# Patient Record
Sex: FEMALE | Race: ASIAN | Employment: FULL TIME | URBAN - METROPOLITAN AREA
[De-identification: names, ages, dates, MRNs, and addresses within clinical notes are randomized per-mention and may not be internally consistent; named-entity substitution may affect disease eponyms.]

---

## 2017-06-22 PROCEDURE — 82607 VITAMIN B-12: CPT | Performed by: FAMILY MEDICINE

## 2017-06-22 PROCEDURE — 82746 ASSAY OF FOLIC ACID SERUM: CPT | Performed by: FAMILY MEDICINE

## 2017-09-15 PROCEDURE — 84479 ASSAY OF THYROID (T3 OR T4): CPT | Performed by: FAMILY MEDICINE

## 2017-11-26 ENCOUNTER — HOSPITAL ENCOUNTER (EMERGENCY)
Facility: HOSPITAL | Age: 42
Discharge: HOME | End: 2017-11-26
Attending: EMERGENCY MEDICINE
Payer: COMMERCIAL

## 2017-11-26 VITALS
TEMPERATURE: 98 F | HEIGHT: 66 IN | RESPIRATION RATE: 16 BRPM | WEIGHT: 132.25 LBS | HEART RATE: 72 BPM | BODY MASS INDEX: 21.25 KG/M2 | SYSTOLIC BLOOD PRESSURE: 100 MMHG | DIASTOLIC BLOOD PRESSURE: 64 MMHG | OXYGEN SATURATION: 100 %

## 2017-11-26 DIAGNOSIS — M54.50 BACK PAIN, LUMBOSACRAL: Primary | ICD-10-CM

## 2017-11-26 DIAGNOSIS — F11.10 OPIATE ABUSE, CONTINUOUS (HCC): ICD-10-CM

## 2017-11-26 PROCEDURE — 99283 EMERGENCY DEPT VISIT LOW MDM: CPT

## 2017-11-26 RX ORDER — CYCLOBENZAPRINE HCL 10 MG
10 TABLET ORAL 3 TIMES DAILY PRN
Qty: 20 TABLET | Refills: 0 | Status: SHIPPED | OUTPATIENT
Start: 2017-11-26 | End: 2017-12-03

## 2017-11-26 NOTE — ED PROVIDER NOTES
Patient Seen in: [facility] Emergency Department    History   Patient presents with:  Back Pain (musculoskeletal)    Stated Complaint: BACK PAIN    HPI    53-year-old female presents to the emergency department for evaluation of increased low back p auscultation. Heart exam: Normal S1-S2 without extra sounds or murmurs. Regular rate and rhythm. Back: There is tenderness over the left SI joint. No posterior midline tenderness, crepitations or step-offs. Extremities are atraumatic.   Skin is dry wit 0

## 2017-11-26 NOTE — ED INITIAL ASSESSMENT (HPI)
Pt arrives with L lower back pain radiating to L glute. Pain is chronic and has gotten worse over the last few weeks. Today pain has increased and un relieved with motrin. +nausea and vomiting. Denies any  complaints. Denies fever or chills.  Pt reports l

## 2017-11-26 NOTE — ED NOTES
Pt states she has ran out of her norco at home, pt states she has been giving her norco to her mother in law.

## 2017-11-26 NOTE — ED NOTES
Patient being discharged home  Patient demonstrates improvement upon d/c. Pt verbalized understanding of d/c instructions. AVS provided.    Pt home with family

## 2020-02-14 ENCOUNTER — HOSPITAL ENCOUNTER (EMERGENCY)
Facility: HOSPITAL | Age: 45
Discharge: HOME | End: 2020-02-14
Payer: COMMERCIAL

## 2020-02-14 ENCOUNTER — APPOINTMENT (OUTPATIENT)
Dept: GENERAL RADIOLOGY | Facility: HOSPITAL | Age: 45
End: 2020-02-14
Payer: COMMERCIAL

## 2020-02-14 VITALS
HEART RATE: 63 BPM | OXYGEN SATURATION: 98 % | RESPIRATION RATE: 18 BRPM | SYSTOLIC BLOOD PRESSURE: 116 MMHG | DIASTOLIC BLOOD PRESSURE: 86 MMHG | BODY MASS INDEX: 22 KG/M2 | WEIGHT: 130 LBS | TEMPERATURE: 98 F
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
## 2020-02-14 DIAGNOSIS — S01.81XA FACIAL LACERATION, INITIAL ENCOUNTER: ICD-10-CM

## 2020-02-14 DIAGNOSIS — S09.90XA MINOR HEAD INJURY, INITIAL ENCOUNTER: ICD-10-CM

## 2020-02-14 PROCEDURE — 12011 RPR F/E/E/N/L/M 2.5 CM/<: CPT

## 2020-02-14 PROCEDURE — 99283 EMERGENCY DEPT VISIT LOW MDM: CPT

## 2020-02-14 PROCEDURE — 12011 RPR F/E/E/N/L/M 2.5 CM/<: CPT | Performed by: NURSE PRACTITIONER

## 2020-02-14 PROCEDURE — 73130 X-RAY EXAM OF HAND: CPT

## 2020-02-15 NOTE — ED PROVIDER NOTES
I reviewed that chart and discussed the case. I have examined the patient and noted very pleasant 73-year-old female that slipped on the ice and fell and struck her head on the wall. There was no LOC.   Happened earlier today around 4 PM.  She is a minor

## 2020-02-15 NOTE — ED INITIAL ASSESSMENT (HPI)
44YF c/c of fall Pt state she slipped on the ice and fall hitting her and injuring her L wrist/hand denies any LOC

## 2020-02-15 NOTE — ED PROVIDER NOTES
Patient Seen in: [facility] Emergency Department      History   Patient presents with:  Head Neck Injury  Laceration Abrasion    Stated Complaint: lacs to face, slipped on ice, no LOC or nv    24-year-old female presents today with history of slip a Comments: No hematoma noted to the scalp. Mouth/Throat:      Mouth: Mucous membranes are moist.   Eyes:      Extraocular Movements: Extraocular movements intact.       Conjunctiva/sclera: Conjunctivae normal.      Pupils: Pupils are equal, round, and Patient tolerated procedure well  Wound was dressed with Neosporin ointment and gauze dressing applied  Tetanus status: UTD. Antiobitic prophylaxis: None  Suture removal in 5 days  Wound care instructions given.  Keep affected area keep and clean  [facility]

## 2020-11-12 PROBLEM — M25.831 ULNAR ABUTMENT SYNDROME OF RIGHT WRIST: Status: ACTIVE | Noted: 2020-11-12

## 2021-03-05 PROBLEM — M47.816 LUMBAR SPONDYLOSIS: Status: ACTIVE | Noted: 2021-03-05

## 2021-03-05 PROBLEM — M48.061 LUMBAR FORAMINAL STENOSIS: Status: ACTIVE | Noted: 2021-03-05

## 2024-01-03 ENCOUNTER — HOSPITAL ENCOUNTER (OUTPATIENT)
Age: 49
Discharge: HOME | End: 2024-01-03
Payer: COMMERCIAL

## 2024-01-03 VITALS
HEART RATE: 61 BPM | TEMPERATURE: 98 F | DIASTOLIC BLOOD PRESSURE: 80 MMHG | OXYGEN SATURATION: 100 % | SYSTOLIC BLOOD PRESSURE: 139 MMHG | RESPIRATION RATE: 18 BRPM

## 2024-01-03 DIAGNOSIS — M54.50 LUMBAR PAIN: Primary | ICD-10-CM

## 2024-01-03 RX ORDER — HYDROCODONE BITARTRATE AND ACETAMINOPHEN 5; 325 MG/1; MG/1
1 TABLET ORAL ONCE
Status: COMPLETED | OUTPATIENT
Start: 2024-01-03 | End: 2024-01-03

## 2024-01-03 RX ORDER — CYCLOBENZAPRINE HCL 10 MG
10 TABLET ORAL 3 TIMES DAILY PRN
Qty: 20 TABLET | Refills: 0 | Status: SHIPPED | OUTPATIENT
Start: 2024-01-03 | End: 2024-01-10

## 2024-01-03 RX ORDER — IBUPROFEN 600 MG/1
600 TABLET ORAL ONCE
Status: COMPLETED | OUTPATIENT
Start: 2024-01-03 | End: 2024-01-03

## 2024-01-03 RX ORDER — HYDROCODONE BITARTRATE AND ACETAMINOPHEN 5; 325 MG/1; MG/1
1 TABLET ORAL EVERY 6 HOURS PRN
Qty: 15 TABLET | Refills: 0 | Status: SHIPPED | OUTPATIENT
Start: 2024-01-03 | End: 2024-01-08

## 2024-01-04 NOTE — ED INITIAL ASSESSMENT (HPI)
Pt c/o sudden onset of low back pain. Pt denies injury. Pt has been taking motrin. Pt states the pain has been for 3 days.

## 2024-01-04 NOTE — DISCHARGE INSTRUCTIONS
Take the Flexeril and Norco as directed.  If you take the Norco use a stool softener such as Colace and do not drive for 6 to 8 hours.  Apply lidocaine and Salonpas patches to area.  If any numbness or weakness to your extremities, incontinence, or worsening pain go to the emergency room.  Follow-up with your spine doctor.

## 2024-01-04 NOTE — ED PROVIDER NOTES
Patient Seen in: [facility]      History     Chief Complaint   Patient presents with    Back Pain     Stated Complaint: Back pain    Subjective:   HPI  48-year-old female with lumbar foraminal stenosis, lumbar spondylosis, and back pain presents complaining of lower back pain onset 3 days ago without any known injury.  She denies any numbness or weakness to her extremities.  She denies any loss of bowel or bladder infection.  She called her spine pain management doctor to request an epidural injection but did not hear back from them.  Her last injection was in October 2022.    Objective:   Past Medical History:   Diagnosis Date    Hospitalism     none              No pertinent past surgical history.              No pertinent social history.            Review of Systems   All other systems reviewed and are negative.      Positive for stated complaint: Back pain  Other systems are as noted in HPI.  Constitutional and vital signs reviewed.      All other systems reviewed and negative except as noted above.    Physical Exam     ED Triage Vitals [01/03/24 1937]   /80   Pulse 61   Resp 18   Temp 98.1 °F (36.7 °C)   Temp src Temporal   SpO2 100 %   O2 Device None (Room air)       Current:/80   Pulse 61   Temp 98.1 °F (36.7 °C) (Temporal)   Resp 18   LMP 12/20/2023   SpO2 100%         Physical Exam  Vitals and nursing note reviewed.   Constitutional:       Appearance: She is well-developed.   Cardiovascular:      Rate and Rhythm: Normal rate and regular rhythm.      Heart sounds: Normal heart sounds.   Pulmonary:      Effort: Pulmonary effort is normal.      Breath sounds: Normal breath sounds.   Musculoskeletal:      Comments: Lumbar spinal tenderness.  Strength and sensation intact to lower extremities.  Ambulatory with no foot drop.  Pulses intact.   Skin:     General: Skin is warm and dry.   Neurological:      Mental Status: She is alert and oriented to person, place, and time.                ED Course   Labs Reviewed - No data to display                   [facility]     Medical Decision Making  48-year-old female with lumbar foraminal stenosis, lumbar spondylosis, and back pain presents complaining of lower back pain onset 3 days ago without any known injury.  She denies any numbness or weakness to her extremities.  She denies any loss of bowel or bladder infection.  She called her spine pain management doctor to request an epidural injection but did not hear back from them.  Her last injection was in October 2022.    Clinical impression: Lumbar pain    Differential diagnosis: Lumbar pain, cauda equina, epidural abscess    Patient is in pain.  She has no known injury.  She has no foot drop and no neurodeficit on exam.  She has been taking Motrin for pain.  She will be given a Norco here and discharged with Norco and Flexeril with follow-up with her spine specialist.  ER precautions provided.    Problems Addressed:  Lumbar pain: acute illness or injury        Disposition and Plan     Clinical Impression:  1. Lumbar pain         Disposition:  Discharge  1/3/2024  7:51 pm    Follow-up:  Tyson Campoverde MD  [address] [place] [address]  [address] [place] [address]  [phone]    Call             Medications Prescribed:  Current Discharge Medication List        START taking these medications    Details   cyclobenzaprine 10 MG Oral Tab Take 1 tablet (10 mg total) by mouth 3 (three) times daily as needed for Muscle spasms.  Qty: 20 tablet, Refills: 0      HYDROcodone-acetaminophen 5-325 MG Oral Tab Take 1 tablet by mouth every 6 (six) hours as needed for Pain.  Qty: 15 tablet, Refills: 0    Associated Diagnoses: Lumbar pain

## 2025-03-12 ENCOUNTER — HOSPITAL ENCOUNTER (INPATIENT)
Facility: HOSPITAL | Age: 50
LOS: 6 days | Discharge: NURSING, REHAB OR LONG-TERM CARE | End: 2025-03-19
Attending: EMERGENCY MEDICINE | Admitting: INTERNAL MEDICINE
Payer: COMMERCIAL

## 2025-03-12 ENCOUNTER — APPOINTMENT (OUTPATIENT)
Dept: CT IMAGING | Facility: HOSPITAL | Age: 50
End: 2025-03-12
Attending: EMERGENCY MEDICINE
Payer: COMMERCIAL

## 2025-03-12 ENCOUNTER — APPOINTMENT (OUTPATIENT)
Dept: MRI IMAGING | Facility: HOSPITAL | Age: 50
End: 2025-03-12
Attending: EMERGENCY MEDICINE
Payer: COMMERCIAL

## 2025-03-12 DIAGNOSIS — Z98.890 S/P CERVICAL DISC REPLACEMENT: ICD-10-CM

## 2025-03-12 DIAGNOSIS — Z98.890 HX OF CERVICAL SPINE SURGERY: Primary | ICD-10-CM

## 2025-03-12 DIAGNOSIS — R53.1 WEAKNESS: ICD-10-CM

## 2025-03-12 PROBLEM — R27.0 ATAXIA: Status: ACTIVE | Noted: 2025-03-12

## 2025-03-12 LAB
ALBUMIN SERPL-MCNC: 4.4 G/DL (ref 3.2–4.8)
ALBUMIN/GLOB SERPL: 1.5 {RATIO} (ref 1–2)
ALP LIVER SERPL-CCNC: 68 U/L
ALT SERPL-CCNC: 15 U/L
ANION GAP SERPL CALC-SCNC: 11 MMOL/L (ref 0–18)
AST SERPL-CCNC: 28 U/L (ref ?–34)
BASOPHILS # BLD AUTO: 0.04 X10(3) UL (ref 0–0.2)
BASOPHILS NFR BLD AUTO: 0.4 %
BILIRUB SERPL-MCNC: 0.2 MG/DL (ref 0.3–1.2)
BUN BLD-MCNC: 8 MG/DL (ref 9–23)
CALCIUM BLD-MCNC: 8.8 MG/DL (ref 8.7–10.6)
CHLORIDE SERPL-SCNC: 112 MMOL/L (ref 98–112)
CO2 SERPL-SCNC: 20 MMOL/L (ref 21–32)
CREAT BLD-MCNC: 0.69 MG/DL
EGFRCR SERPLBLD CKD-EPI 2021: 104 ML/MIN/1.73M2 (ref 60–?)
EOSINOPHIL # BLD AUTO: 0.02 X10(3) UL (ref 0–0.7)
EOSINOPHIL NFR BLD AUTO: 0.2 %
ERYTHROCYTE [DISTWIDTH] IN BLOOD BY AUTOMATED COUNT: 11.9 %
GLOBULIN PLAS-MCNC: 2.9 G/DL (ref 2–3.5)
GLUCOSE BLD-MCNC: 143 MG/DL (ref 70–99)
HCT VFR BLD AUTO: 36.3 %
HGB BLD-MCNC: 12.1 G/DL
IMM GRANULOCYTES # BLD AUTO: 0.15 X10(3) UL (ref 0–1)
IMM GRANULOCYTES NFR BLD: 1.5 %
LYMPHOCYTES # BLD AUTO: 0.91 X10(3) UL (ref 1–4)
LYMPHOCYTES NFR BLD AUTO: 9.4 %
MCH RBC QN AUTO: 29.5 PG (ref 26–34)
MCHC RBC AUTO-ENTMCNC: 33.3 G/DL (ref 31–37)
MCV RBC AUTO: 88.5 FL
MONOCYTES # BLD AUTO: 0.11 X10(3) UL (ref 0.1–1)
MONOCYTES NFR BLD AUTO: 1.1 %
NEUTROPHILS # BLD AUTO: 8.5 X10 (3) UL (ref 1.5–7.7)
NEUTROPHILS # BLD AUTO: 8.5 X10(3) UL (ref 1.5–7.7)
NEUTROPHILS NFR BLD AUTO: 87.4 %
OSMOLALITY SERPL CALC.SUM OF ELEC: 297 MOSM/KG (ref 275–295)
PLATELET # BLD AUTO: 312 10(3)UL (ref 150–450)
POTASSIUM SERPL-SCNC: 3.6 MMOL/L (ref 3.5–5.1)
PROT SERPL-MCNC: 7.3 G/DL (ref 5.7–8.2)
RBC # BLD AUTO: 4.1 X10(6)UL
SODIUM SERPL-SCNC: 143 MMOL/L (ref 136–145)
WBC # BLD AUTO: 9.7 X10(3) UL (ref 4–11)

## 2025-03-12 PROCEDURE — 72141 MRI NECK SPINE W/O DYE: CPT | Performed by: EMERGENCY MEDICINE

## 2025-03-12 PROCEDURE — 96375 TX/PRO/DX INJ NEW DRUG ADDON: CPT

## 2025-03-12 PROCEDURE — 96374 THER/PROPH/DIAG INJ IV PUSH: CPT

## 2025-03-12 PROCEDURE — 80053 COMPREHEN METABOLIC PANEL: CPT | Performed by: EMERGENCY MEDICINE

## 2025-03-12 PROCEDURE — 99285 EMERGENCY DEPT VISIT HI MDM: CPT

## 2025-03-12 PROCEDURE — 96376 TX/PRO/DX INJ SAME DRUG ADON: CPT

## 2025-03-12 PROCEDURE — 72125 CT NECK SPINE W/O DYE: CPT | Performed by: EMERGENCY MEDICINE

## 2025-03-12 PROCEDURE — 85025 COMPLETE CBC W/AUTO DIFF WBC: CPT | Performed by: EMERGENCY MEDICINE

## 2025-03-12 PROCEDURE — 96361 HYDRATE IV INFUSION ADD-ON: CPT

## 2025-03-12 RX ORDER — ACETAMINOPHEN 500 MG
1000 TABLET ORAL EVERY 8 HOURS PRN
COMMUNITY

## 2025-03-12 RX ORDER — ONDANSETRON 2 MG/ML
4 INJECTION INTRAMUSCULAR; INTRAVENOUS EVERY 4 HOURS PRN
Status: ACTIVE | OUTPATIENT
Start: 2025-03-12 | End: 2025-03-12

## 2025-03-12 RX ORDER — HYDROCODONE BITARTRATE AND ACETAMINOPHEN 5; 325 MG/1; MG/1
2 TABLET ORAL EVERY 4 HOURS PRN
Status: DISCONTINUED | OUTPATIENT
Start: 2025-03-12 | End: 2025-03-13

## 2025-03-12 RX ORDER — LORAZEPAM 2 MG/ML
1 INJECTION INTRAMUSCULAR ONCE
Status: COMPLETED | OUTPATIENT
Start: 2025-03-12 | End: 2025-03-12

## 2025-03-12 RX ORDER — HYDROMORPHONE HYDROCHLORIDE 1 MG/ML
0.8 INJECTION, SOLUTION INTRAMUSCULAR; INTRAVENOUS; SUBCUTANEOUS EVERY 2 HOUR PRN
Status: DISCONTINUED | OUTPATIENT
Start: 2025-03-12 | End: 2025-03-17

## 2025-03-12 RX ORDER — ONDANSETRON 2 MG/ML
4 INJECTION INTRAMUSCULAR; INTRAVENOUS ONCE
Status: COMPLETED | OUTPATIENT
Start: 2025-03-12 | End: 2025-03-12

## 2025-03-12 RX ORDER — ACETAMINOPHEN 325 MG/1
650 TABLET ORAL EVERY 4 HOURS PRN
Status: DISCONTINUED | OUTPATIENT
Start: 2025-03-12 | End: 2025-03-19

## 2025-03-12 RX ORDER — HYDROMORPHONE HYDROCHLORIDE 1 MG/ML
0.2 INJECTION, SOLUTION INTRAMUSCULAR; INTRAVENOUS; SUBCUTANEOUS EVERY 2 HOUR PRN
Status: DISCONTINUED | OUTPATIENT
Start: 2025-03-12 | End: 2025-03-17

## 2025-03-12 RX ORDER — HYDROCODONE BITARTRATE AND ACETAMINOPHEN 5; 325 MG/1; MG/1
1 TABLET ORAL EVERY 4 HOURS PRN
Status: DISCONTINUED | OUTPATIENT
Start: 2025-03-12 | End: 2025-03-13

## 2025-03-12 RX ORDER — ENOXAPARIN SODIUM 100 MG/ML
40 INJECTION SUBCUTANEOUS DAILY
Status: DISCONTINUED | OUTPATIENT
Start: 2025-03-13 | End: 2025-03-13

## 2025-03-12 RX ORDER — CYCLOBENZAPRINE HCL 10 MG
1 TABLET ORAL 3 TIMES DAILY PRN
COMMUNITY
Start: 2025-03-10

## 2025-03-12 RX ORDER — HYDROMORPHONE HYDROCHLORIDE 1 MG/ML
1 INJECTION, SOLUTION INTRAMUSCULAR; INTRAVENOUS; SUBCUTANEOUS ONCE
Status: COMPLETED | OUTPATIENT
Start: 2025-03-12 | End: 2025-03-12

## 2025-03-12 RX ORDER — HYDROCODONE BITARTRATE AND ACETAMINOPHEN 10; 325 MG/1; MG/1
1 TABLET ORAL 3 TIMES DAILY PRN
COMMUNITY
Start: 2025-02-24

## 2025-03-12 RX ORDER — HYDROMORPHONE HYDROCHLORIDE 1 MG/ML
0.5 INJECTION, SOLUTION INTRAMUSCULAR; INTRAVENOUS; SUBCUTANEOUS EVERY 30 MIN PRN
Status: DISCONTINUED | OUTPATIENT
Start: 2025-03-12 | End: 2025-03-12

## 2025-03-12 RX ORDER — HYDROMORPHONE HYDROCHLORIDE 1 MG/ML
0.4 INJECTION, SOLUTION INTRAMUSCULAR; INTRAVENOUS; SUBCUTANEOUS EVERY 2 HOUR PRN
Status: DISCONTINUED | OUTPATIENT
Start: 2025-03-12 | End: 2025-03-17

## 2025-03-12 NOTE — ED QUICK NOTES
Pt return from MRI, placed back on monitor. Pt given call light, lights dimmed, pt has no additional needs at this time.

## 2025-03-12 NOTE — ED INITIAL ASSESSMENT (HPI)
Patient to ED via EMS, reports she was having a c4-c5, c5-c6 cervical disc replacement today.  During procedure it was noticed the patient had some decreased sensation, numbness and tingling to bilateral hands and feet.  Sent to ED for imaging

## 2025-03-12 NOTE — ED QUICK NOTES
Orders for admission, patient is aware of plan and ready to go upstairs. Any questions, please call ED RN Stephanie at extension [phone].     Patient Covid vaccination status: Fully vaccinated     COVID Test Ordered in ED: None    COVID Suspicion at Admission: N/A    Running Infusions:      Mental Status/LOC at time of transport: A&Ox4    Other pertinent information:   CIWA score: N/A   NIH score:  N/A

## 2025-03-12 NOTE — H&P
[facility] Hospitalist History and Physical      PCP: Hu Orozco DO    History of Present Illness: This is the story of Ms. Brigitte Ac who is a 49 y/o F w/ no major PMHx who presents after outpatient cervical spine surgery where she underwent a C4-C5, C5-C6 Anterior Cervical Discectomy and cervical disk replacement. Post operatively it appears the patient had notable weakness in the upper and lower extremities and was having difficulties bearing weight and walking/transferring on her own. ER evaluation was recommended. Patient admits to some parathesias in her fingers/toes that are still persisting but otherwise no other major complaints. Denies dizziness/lightheadedness, dysuria, n/v/d.  ED Vitals: wnl  Labs:  Stable    CT Cervical Spine: showed post surgical changes of microdiscectomy w/ disc prostheses at C4/C5 and C5/C6 levels, no subluxaion, no acute fracture  MRI Cervical Spine: post operative changes and osteophytes but otherwise no major issues.    Spine surgery was consulted, patient was given multiple doses of pain medications, Ativan, Zofran, and 1L NS bolus and admitted for observation overnight.    Past Medical History:    Hospitalism    none      History reviewed. No pertinent surgical history.     No current outpatient medications on file.       Social History     Tobacco Use    Smoking status: Never    Smokeless tobacco: Never   Substance Use Topics    Alcohol use: Yes     Alcohol/week: 0.0 standard drinks of alcohol     Comment: socially        OBJECTIVE:  /90   Pulse 99   Temp 97.8 °F (36.6 °C) (Oral)   Resp (!) 28   Ht 5' 4\" (1.626 m)   Wt 118 lb (53.5 kg)   LMP 01/22/2025 (Approximate)   SpO2 100%   BMI 20.25 kg/m²   Gen: No acute distress, alert and oriented x3  Pulm: Lungs clear bilaterally, normal respiratory effort  CV: Heart with regular rate and rhythm  Abd: Abdomen soft, nontender, non-distended  Skin: no rashes or lesions  Extremities: no edema noted  Neuro:   no focal deficits, full strength in uppers and lowers, no sensation changes, no speech deficits      Data Review:    Pertinent Labs and Imaging independently reviewed    Assessment/Plan:   Outpatient records or previous hospital records reviewed.   Chad Freemanist to continue to follow patient while in house    A/P: 51 y/o F w/ no major PMHx who presents after outpatient cervical spine surgery where she underwent a C4-C5, C5-C6 Anterior Cervical Discectomy and cervical disk replacement. Presenting for upper/lower extremity weakness/ataxia    Ataxia/Parasthesias  Hx of Cervical Spine surgery today  POD0  CT/MRIs noted, no major abnormalities  Plan:  - Spine surgery on consult  - PRN pain control  - PT/OT evaluations      A total of 38 minutes taken with patient and coordinating care.  Greater than 50% face to face encounter.      Ace Germain D.O.  [facility]

## 2025-03-12 NOTE — ED QUICK NOTES
Care endorsed from Beryl RN    Pt  at bedside, pt awaiting MRI. Pt sitting upright in bed with a post op bandage on her anterior neck.

## 2025-03-13 LAB
ANION GAP SERPL CALC-SCNC: 8 MMOL/L (ref 0–18)
BASOPHILS # BLD AUTO: 0.03 X10(3) UL (ref 0–0.2)
BASOPHILS NFR BLD AUTO: 0.2 %
BUN BLD-MCNC: 8 MG/DL (ref 9–23)
CALCIUM BLD-MCNC: 9.2 MG/DL (ref 8.7–10.6)
CHLORIDE SERPL-SCNC: 108 MMOL/L (ref 98–112)
CO2 SERPL-SCNC: 26 MMOL/L (ref 21–32)
CREAT BLD-MCNC: 0.67 MG/DL
EGFRCR SERPLBLD CKD-EPI 2021: 105 ML/MIN/1.73M2 (ref 60–?)
EOSINOPHIL # BLD AUTO: 0 X10(3) UL (ref 0–0.7)
EOSINOPHIL NFR BLD AUTO: 0 %
ERYTHROCYTE [DISTWIDTH] IN BLOOD BY AUTOMATED COUNT: 11.9 %
GLUCOSE BLD-MCNC: 135 MG/DL (ref 70–99)
HCT VFR BLD AUTO: 34.3 %
HGB BLD-MCNC: 11.8 G/DL
IMM GRANULOCYTES # BLD AUTO: 0.07 X10(3) UL (ref 0–1)
IMM GRANULOCYTES NFR BLD: 0.5 %
LYMPHOCYTES # BLD AUTO: 1.54 X10(3) UL (ref 1–4)
LYMPHOCYTES NFR BLD AUTO: 10.7 %
MAGNESIUM SERPL-MCNC: 1.8 MG/DL (ref 1.6–2.6)
MCH RBC QN AUTO: 30.2 PG (ref 26–34)
MCHC RBC AUTO-ENTMCNC: 34.4 G/DL (ref 31–37)
MCV RBC AUTO: 87.7 FL
MONOCYTES # BLD AUTO: 0.96 X10(3) UL (ref 0.1–1)
MONOCYTES NFR BLD AUTO: 6.7 %
NEUTROPHILS # BLD AUTO: 11.77 X10 (3) UL (ref 1.5–7.7)
NEUTROPHILS # BLD AUTO: 11.77 X10(3) UL (ref 1.5–7.7)
NEUTROPHILS NFR BLD AUTO: 81.9 %
OSMOLALITY SERPL CALC.SUM OF ELEC: 294 MOSM/KG (ref 275–295)
PLATELET # BLD AUTO: 341 10(3)UL (ref 150–450)
POTASSIUM SERPL-SCNC: 4.8 MMOL/L (ref 3.5–5.1)
RBC # BLD AUTO: 3.91 X10(6)UL
SODIUM SERPL-SCNC: 142 MMOL/L (ref 136–145)
WBC # BLD AUTO: 14.4 X10(3) UL (ref 4–11)

## 2025-03-13 PROCEDURE — 80048 BASIC METABOLIC PNL TOTAL CA: CPT | Performed by: INTERNAL MEDICINE

## 2025-03-13 PROCEDURE — 97535 SELF CARE MNGMENT TRAINING: CPT

## 2025-03-13 PROCEDURE — 83735 ASSAY OF MAGNESIUM: CPT | Performed by: INTERNAL MEDICINE

## 2025-03-13 PROCEDURE — 97116 GAIT TRAINING THERAPY: CPT

## 2025-03-13 PROCEDURE — 85025 COMPLETE CBC W/AUTO DIFF WBC: CPT | Performed by: INTERNAL MEDICINE

## 2025-03-13 PROCEDURE — 97162 PT EVAL MOD COMPLEX 30 MIN: CPT

## 2025-03-13 PROCEDURE — 97166 OT EVAL MOD COMPLEX 45 MIN: CPT

## 2025-03-13 RX ORDER — FAMOTIDINE 20 MG/1
20 TABLET, FILM COATED ORAL DAILY
Status: DISCONTINUED | OUTPATIENT
Start: 2025-03-13 | End: 2025-03-19

## 2025-03-13 RX ORDER — DEXAMETHASONE SODIUM PHOSPHATE 4 MG/ML
10 VIAL (ML) INJECTION EVERY 8 HOURS
Status: COMPLETED | OUTPATIENT
Start: 2025-03-13 | End: 2025-03-14

## 2025-03-13 RX ORDER — DIAZEPAM 5 MG/1
5 TABLET ORAL EVERY 8 HOURS PRN
Status: DISCONTINUED | OUTPATIENT
Start: 2025-03-13 | End: 2025-03-14

## 2025-03-13 RX ORDER — OXYCODONE AND ACETAMINOPHEN 5; 325 MG/1; MG/1
1 TABLET ORAL EVERY 4 HOURS PRN
Status: DISCONTINUED | OUTPATIENT
Start: 2025-03-13 | End: 2025-03-19

## 2025-03-13 RX ORDER — MAGNESIUM OXIDE 400 MG/1
400 TABLET ORAL ONCE
Status: COMPLETED | OUTPATIENT
Start: 2025-03-13 | End: 2025-03-13

## 2025-03-13 RX ORDER — OXYCODONE AND ACETAMINOPHEN 5; 325 MG/1; MG/1
2 TABLET ORAL EVERY 4 HOURS PRN
Status: DISCONTINUED | OUTPATIENT
Start: 2025-03-13 | End: 2025-03-19

## 2025-03-13 NOTE — PROGRESS NOTES
[facility]   part of [facility] Hospitalist Progress Note     Brigitte Ac Patient Status:  Observation    1975 MRN [id number]   Location [facility] 3SW-A Attending Ace Germain,    Hosp Day # 0 PCP Hu Orozco DO     Assessment & Plan:    A/P: 49 y/o F w/ no major PMHx who presents after outpatient cervical spine surgery where she underwent a C4-C5, C5-C6 Anterior Cervical Discectomy and cervical disk replacement. Presenting for upper/lower extremity weakness/ataxia     Ataxia/Parasthesias  Hx of Cervical Spine surgery today  POD1  CT/MRIs noted, no major abnormalities  Plan:  - Spine surgery on consult, recommending IV steroids, Decadron 10mg q8h scheduled  - Pepcid started while on steroids  - PRN pain control  - PT/OT evaluations    DVT Ppx: no DVT until POD5 as per Spine    Subjective:     Patient seen and examined this morning at bedside. Disappointed and concerned that she is so weak and having coordination issues. Otherwise no other acute complaints. Working w/ PT    Vital signs:  Temp:  [97.4 °F (36.3 °C)-98.2 °F (36.8 °C)] 98.2 °F (36.8 °C)  Pulse:  [64-99] 64  Resp:  [9-29] 18  BP: (109-135)/(70-97) 122/79  SpO2:  [95 %-100 %] 98 %    Physical Exam:    General: No acute distress.   Respiratory: Clear to auscultation bilaterally. No wheezes.  Cardiovascular: S1, S2. Regular rate and rhythm  Abdomen: Soft, nontender, nondistended.  Positive bowel sounds. No rebound or guarding.  Extremities: No edema.  Neuro:  Grossly non focal, no motor deficits. Coordination slowed, 3-4/5 strength in uppers and lowers      Diagnostic Data:    Pertinent Labs and Imaging independently reviewed  Comments:  WBC elevated today  HgB drift noted    Ace Germain D.O.  [facility] Hospitalist     Supplementary Documentation:   DVT Mechanical Prophylaxis:   SCDs,    DVT Pharmacologic Prophylaxis   Medication   None                Code Status: Not on file  Chin: No  urinary catheter in place  Chin Duration (in days):   Central line:    CHRISTINA:

## 2025-03-13 NOTE — PLAN OF CARE
Patient A&OX4, VSS on RA. Patient reports severe pain to posterior neck; IV pain medications given. Spine PA made aware of current pain regimen; see orders. IV steroids started. PT recc Acute Rehab. Patient interested in DC home. Plan to DC pending medical clearance and mobility. Plan of care reviewed with patient. Patient demonstrates understanding.

## 2025-03-13 NOTE — OCCUPATIONAL THERAPY NOTE
OCCUPATIONAL THERAPY NEURO EVALUATION - INPATIENT     Room Number: 376/376-A  Evaluation Date: 3/13/2025  Type of Evaluation: Initial  Presenting Problem: upper/lower extremity weakness/ataxia    Physician Order: IP Consult to Occupational Therapy  Reason for Therapy: ADL/IADL Dysfunction and Discharge Planning    OCCUPATIONAL THERAPY ASSESSMENT   Patient is currently functioning below baseline with toileting, bathing, upper body dressing, lower body dressing, grooming, bed mobility, transfers, static standing balance, dynamic standing balance, and functional standing tolerance. Prior to admission, patient's baseline is independent, active.  Patient is requiring SBA to MOD A as a result of the following impairments: decreased functional strength, decreased functional reach, pain, impaired standing balance, impaired coordination, impaired motor planning, decreased muscular endurance, and decreased sensation. Occupational Therapy will continue to follow for duration of hospitalization.    Patient will benefit from continued skilled OT Services to facilitate return to prior level of function as patient demonstrates high motivation with excellent tolerance to an intensive therapy program    History Related to Current Admission: Patient is a 50 year old female admitted on 3/12/2025 from home for new onset of upper/lower extremity weakness and ataxia. Pt had OP C4-6 CDR on 3/12.     Co-Morbidities : s/p C4-C6 CDR as an OP 3/12    Imaging:  MRI cervical spine 3/12  1. Expected postoperative changes are noted at C4-C5 and C5-C6 with fluid signal within the disc space surrounding the disc space device and also in the perispinal soft tissues anteriorly.   2. Persistent osteophytes cause narrowing of the central canal at the fused levels.  Within the limits of this study there is no significant cord signal abnormality.     Recommendations for nursing staff:   Transfers: MIN to MOD A with RW  Toileting location: toilet      EVALUATION SESSION:  Patient Start of Session: semi-supine     FUNCTIONAL TRANSFER ASSESSMENT  Sit to Stand: Edge of Bed  Edge of Bed: Moderate Assist  Toilet Transfer: Minimal Assist    BED MOBILITY  Supine to Sit : Moderate Assist  Scooting: CGA    BALANCE ASSESSMENT  Static Sitting: Stand-by Assist  Static Standing: Moderate Assist    FUNCTIONAL ADL ASSESSMENT  Grooming Standing: Moderate Assist  LB Dressing Seated: Moderate Assist  LB Dressing Standing: Contact Guard Assist  Toileting Seated: Stand-by Assist  Toileting Standing: Minimal Assist    ACTIVITY TOLERANCE: Pt on room air and denies SOB, dizziness or lightheadedness throughout session. No significant change in vitals noted.     COGNITION  Overall Cognitive Status:  WFL - within functional limits    VISION  No visual changes     PERCEPTION  Overall Perception Status:   WFL - within functional limits    Communication: Pt able to communicate basic wants and needs without difficulty.    Behavioral/Emotional/Social: Pt is very pleasant and agreeable to session. Pt is understandable overwhelmed with these unexpected symptoms after surgery but appears to be coping as well as can be expected.     UPPER EXTREMITY  ROM: within functional limits   Strength: impaired   Coordination  Gross motor: impaired   Fine motor: impaired   Sensation:  dec'd sensation to BUE/BLEs    Shoulder flexion: 3-/5  Elbow flexion: 3/5  Forearm supination: 3/5  Wrist flexion: 3/5  Digit flexion: 3/5  Shoulder joint integrity: intact     Neurological findings:  Neurological Findings: Coordination - Finger to Nose, Coordination - Rapid Alternating Movement, Coordination - Finger Opposition  Coordination - Finger to Nose: Left decreased speed, Right decreased speed  Coordination - Rapid Alternating Movement: Left decreased speed, Right decreased speed  Coordination - Finger Opposition: Left decreased speed, Right decreased speed       EDUCATION PROVIDED  Patient Education : Role of  Occupational Therapy; Plan of Care; Discharge Recommendations; DME Recommendations; Functional Transfer Techniques; Fall Prevention; Posture/Positioning; Proper Body Mechanics  Patient's Response to Education: Verbalized Understanding; Returned Demonstration  Family/Caregiver Education : Plan of Care; Role of Occupational Therapy  Family/Caregiver's Response to Education: Verbalized Understanding    Equipment used: RW GB  Demonstrates functional use, Would benefit from additional trial      Therapist comments: Encouraged Pt to continue utilizing BUEs during functional tasks as much as possible to support improved coordination. Will address formal HEP next session. Pt is highly motivated.     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 Pt is typically completely independent with ADL/IADLs and mobility without device. Pt is active, works, drives.     SUBJECTIVE   \"This wasn't the outcome we expected.\"    PAIN ASSESSMENT  Rating: Other (Comment) (did not formallt rate, stated \"it's not good.\")  Location: low back and neck  Management Techniques: Activity promotion, Body mechanics, Breathing techniques    OBJECTIVE  Precautions: Spine  Fall Risk: High fall risk    ASSESSMENTS  AM-PAC ‘6-Clicks’ Inpatient Daily Activity Short Form  -   Putting on and taking off regular lower body clothing?: A Lot  -   Bathing (including washing, rinsing,  drying)?: A Little  -   Toileting, which includes using toilet, bedpan or urinal? : A Little  -   Putting on and taking off regular upper body clothing?: A Little  -   Taking care of personal grooming such as brushing teeth?: A Little  -   Eating meals?: A Little    AM-PAC Score:  Score: 17  Approx Degree of Impairment: 50.11%  Standardized Score (AM-PAC Scale): 37.26    ADDITIONAL TESTS     NEUROLOGICAL FINDINGS  Coordination - Finger to Nose: Left decreased speed; Right decreased speed  Coordination - Rapid Alternating Movement: Left decreased speed; Right decreased speed  Coordination - Finger Opposition: Left decreased speed; Right decreased speed     PLAN  OT Device Recommendations: TBD  OT Treatment Plan: Balance activities, ADL training, Functional transfer training, UE strengthening/ROM, Patient/Family education, Patient/Family training, Equipment eval/education, Neuromuscluar reeducation, Fine motor coordination activities, Compensatory technique education  Rehab Potential : Good  Frequency: 5x/week  Number of Visits to Meet Established Goals: 5    ADL Goals   Patient will perform eating: with set up  Patient will perform grooming: with setup  Patient will perform upper body dressing:  with setup  Patient will perform lower body dressing:  with supervision  Patient will perform toileting: with supervision    Functional Transfer Goals  Patient will transfer from supine to sit:  with stand by assist  Patient will transfer from sit to stand:  with stand by assist  Patient will transfer to toilet:  with supervision    UE Exercise Program Goal  Patient will be supervision with bilateral coordination HEP (home exercise program).    Additional Goals  Pt will tolerate standing for functional task x5 mins with supervision    Therapist Goals  Complete PhQ9    Patient Evaluation Complexity Level:   Occupational Profile/Medical History MODERATE - Expanded review of history including review of medical or therapy record    Specific performance deficits impacting engagement in ADL/IADL MODERATE  3 - 5 performance deficits   Client Assessment/Performance Deficits MODERATE - Comorbidities and min to mod modifications of tasks    Clinical Decision Making MODERATE - Analysis of occupational profile, detailed assessments, several treatment options    Overall Complexity MODERATE     OT Session Time: 30 minutes  Self-Care Home Management: 15 minutes

## 2025-03-13 NOTE — PROGRESS NOTES
[facility]   part of [facility]    Progress Note    Brigitte Ac Patient Status:  Observation    1975 MRN [id number]   Location [facility] 3SW-A Attending Ace Germain,    Hosp Day # 0 PCP Hu Orozco DO     S:  Patient presents after having symptoms in all four extremities after her outpatient cervical disc replacement yesterday. She notes numbness, parathesias, and weakness in all four extremities. She states that the pain is minimal.     Inspection: Awake Alert  No acute distress.     Blood pressure 122/79, pulse 64, temperature 97.4 °F (36.3 °C), temperature source Oral, resp. rate 18, height 5' 4\" (1.626 m), weight 118 lb (53.5 kg), last menstrual period 2025, SpO2 98%, not currently breastfeeding.    Recent Labs   Lab 25  0438   RBC 3.91   HGB 11.8*   HCT 34.3*   MCV 87.7   MCH 30.2   MCHC 34.4   RDW 11.9   NEPRELIM 11.77*   WBC 14.4*   .0       Incision:  Dressing in place with minimal drainage noted    Neck supple      Strength testing:   Left   Right   5/5 Deltoid  5/5 Deltoid    5/5 Biceps  5/5 Biceps   5/5 Triceps  5/5 Triceps   3/5    3/5    2/5 Intrinsics  2/5 Intrinsics    Lower extremities:    5/5 bilateral plantar flexion   3/5 bilateral dorsiflexion   4/5 bilateral EHL   5/5 bilateral quad strength   5/5 bilateral hip flexors       Sensation: Normal sensation noted to light touch and pressure throughout bilateral upper extremities.    Calves supple NT bilaterally    A/P: POD # 1 s/p C4-C6 CDR    P: Patient is doing well overall. New onset radicular symptoms and weakness since surgery. Continue to monitor. Okay to work with PT/OT.     Bree Bulnt PA-C   25

## 2025-03-13 NOTE — CM/SW NOTE
03/13/25 1500   CM/SW Referral Data   Referral Source Social Work (self-referral)   Reason for Referral Discharge planning   Informant Patient;EMR;Clinical Staff Member   Patient Info   Patient's Current Mental Status at Time of Assessment Alert;Oriented   Patient lives with Spouse/Significant other   Patient Status Prior to Admission   Independent with ADLs and Mobility Yes   Discharge Needs   Anticipated D/C needs To be determined     HOME SITUATION per PT eval  Type of Home: House  Home Layout: Two level, Able to live on main level  Stairs to Enter : 2           Lives With: Spouse, Family    Drives: Yes   Patient Regularly Uses: None       Prior Level of [place] per PT eval: Pt lives with spouse and 2 children (13 and 18 y.o) in 2 story home. Primary bed/bath is on the main level of the home. Pt active and independent at baseline.        Patient is a 51 y/o woman admitted s/p outpatient C4-6 disc replacement on 3/12 with numbness and weakness in her extremities.  Informed by therapy that pt would benefit from intensive rehab setting at discharge.    Met with pt at bedside to discuss DC Planning and therapy evaluation.  Pt stated that she does not wish to go to rehab at discharge and plans to discharge to home with her .  Reviewed AR and offered PMR consult so that pt could learn more about acute inpatient rehab and potential benefits to her.  Pt did declined PMR consult stating that she has no interest in going into rehab facility.  Offered [facility] services and pt is unsure if she would like this.  She will discuss with her .  / to remain available for support and/or discharge planning.     Mallory Starkey, [facility]  Discharge Planner  [phone]

## 2025-03-13 NOTE — PLAN OF CARE
Patient A&Ox4, VSS on RA. Patient reports severe pain to posterior neck; IV pain medications given. Dressing to neck intact with scant drainage. Patient up mod x2 with RW to commode. Plan to see PT/OT. Plan of care reviewed with patient. Patient demonstrates understanding.

## 2025-03-13 NOTE — PLAN OF CARE
Received pt at 1900. Pt is A&O x 4; follows commands, here d/t decreased sensation with numbness and tingling to all extremities port op C4-C5 disk replacement with Dr York OP. Pt is on RA, VSS, tolerating reg diet. Pt is continent of bowel and bladder. Pt's pain is managed with PRN medication.Pt ambulates with 2 assist , GB and walker d/t unsteady gait and weakness. IV access is patent currently SL. Shift assessment performed, HS meds given. 2 person skin assessment completed with RAMBO [place]. Anterior neck site dry & intact with small old drainage noted.  NOC safety plan in place; bed in low position, call light and personal items within reach, family to stay at bedside, pt encouraged to call staff for assistance.

## 2025-03-13 NOTE — PHYSICAL THERAPY NOTE
PHYSICAL THERAPY EVALUATION - INPATIENT     Room Number: 376/376-A  Evaluation Date: 3/13/2025  Type of Evaluation: Initial  Physician Order: PT Eval and Treat    Presenting Problem: UE/LE weakness  Co-Morbidities : s/p C4-C6 CDR as an OP 3/12  Reason for Therapy: Mobility Dysfunction and Discharge Planning    PHYSICAL THERAPY ASSESSMENT   Patient is a 50 year old female admitted 3/12/2025 for B UE/LE weakness and paresthesias s/p elective OP C4-6 CDF on 3/12/25. MRI C/S without significant findings.     Prior to admission, patient's baseline is independent.  Patient is currently functioning below baseline with bed mobility, transfers, gait, stair negotiation, and standing prolonged periods.  Patient is requiring minimal assist and moderate assist as a result of the following impairments: decreased functional strength, pain, impaired standing balance, impaired motor planning, decreased muscular endurance, and medical status.  Physical Therapy will continue to follow for duration of hospitalization.    Patient will benefit from continued skilled PT Services to facilitate return to prior level of function as patient demonstrates high motivation with excellent tolerance to an intensive therapy program .    PLAN DURING HOSPITALIZATION  Nursing Mobility Recommendation : 1 Assist  PT Device Recommendation: Rolling walker  PT Treatment Plan: Bed mobility, Endurance, Energy conservation, Patient education, Gait training, Strengthening, Neuromuscular re-educate, Balance training, Transfer training, Stair training  Rehab Potential : Good  Frequency (Obs): Daily     CURRENT GOALS    Goal #1 Patient is able to demonstrate supine - sit EOB @ level: minimum assistance     Goal #2 Patient is able to demonstrate transfers EOB to/from BSC at assistance level: supervision     Goal #3 Patient is able to ambulate 100 feet with assist device: walker - rolling at assistance level: minimum assistance     Goal #4 Pt will demonstrate good  understanding of spine precautions    Goal #5    Goal #6    Goal Comments: Goals established on 3/13/2025      HOME SITUATION  Type of Home: House  Home Layout: Two level, Able to live on main level  Stairs to Enter : 2                  Lives With: Spouse, Family    Drives: Yes   Patient Regularly Uses: None      Prior Level of [place]: Pt lives with spouse and 2 children (13 and 18 y.o) in 2 story home. Primary bed/bath is on the main level of the home. Pt active and independent at baseline.     SUBJECTIVE  \"I know this isn't normal.\"     OBJECTIVE  Precautions: Spine  Fall Risk: High fall risk    WEIGHT BEARING RESTRICTION     PAIN ASSESSMENT  Rating: Unable to rate  Location: neck  Management Techniques: Activity promotion, Repositioning    COGNITION  Overall Cognitive Status:  WFL - within functional limits    RANGE OF MOTION AND STRENGTH ASSESSMENT  Upper extremity ROM and strength - see OT eval    Lower extremity ROM is within functional limits     Lower extremity strength is within functional limits except for the following:    Right Knee extension  3+/5  Left Knee extension  3+/5  Right Dorsiflexion  3+/5  Left Dorsiflexion  3+/5    BALANCE  Static Sitting: Fair  Dynamic Sitting: Fair -  Static Standing: Poor +  Dynamic Standing: Poor +    ADDITIONAL TESTS                                    ACTIVITY TOLERANCE           BP: 100/51        Patient Position: Sitting    O2 WALK       NEUROLOGICAL FINDINGS  Neurological Findings: Sensation, Coordination - Heel to Shin, Coordination - Finger to Nose  Coordination - Finger to Nose: Left decreased speed, Left decreased accuracy, Right decreased speed, Right decreased accuracy  Coordination - Heel to Shin: Left decreased speed, Left decreased accuracy, Right decreased speed, Right decreased accuracy     Sensation: diminished light touch sensation B UE/LEs         AM-PAC '6-Clicks' INPATIENT SHORT FORM - BASIC MOBILITY  How much difficulty does the patient  currently have...  Patient Difficulty: Turning over in bed (including adjusting bedclothes, sheets and blankets)?: A Lot   Patient Difficulty: Sitting down on and standing up from a chair with arms (e.g., wheelchair, bedside commode, etc.): A Lot   Patient Difficulty: Moving from lying on back to sitting on the side of the bed?: A Lot   How much help from another person does the patient currently need...   Help from Another: Moving to and from a bed to a chair (including a wheelchair)?: A Little   Help from Another: Need to walk in hospital room?: A Little   Help from Another: Climbing 3-5 steps with a railing?: A Lot     AM-PAC Score:  Raw Score: 14   Approx Degree of Impairment: 61.29%   Standardized Score (AM-PAC Scale): 38.1   CMS Modifier (G-Code): CL    FUNCTIONAL ABILITY STATUS  Gait Assessment   Functional Mobility/Gait Assessment  Gait Assistance: Minimum assistance  Distance (ft): 50  Assistive Device: Rolling walker    Skilled Therapy Provided   VC for log roll.   MOD assist for R rolling and supine-sit to EOB.   Assist to scoot to EOB.   VC for transfer set up.   Sit-stand to RW and MOD assist for balance.   Pt ambulated to bathroom with RW and MIN assist for balance/walker management.   Ambulates with very slow sun, flat foot initial contact, and deliberate foot placement.   Performed toilet transfer with MIN assist for balance.   Performed ADL at sink with MIN/MOD assist for balance.   Pt ambulated 50ft with RW and MIN assist for balance/safety.   VC for sequencing and posture.   Returned to room sitting up in bedside chair.       Bed Mobility:  Rolling: MOD  Supine to sit: MOD   Sit to supine: NT     Transfer Mobility:  Sit to stand: MOD   Stand to sit: MOD  Gait = MIN    Therapist's Comments:  Reviewed activity recommendations, log roll, spine precautions, and POC. Pt demos generalized weakness, sensory impairments, and slow motor planning.     Exercise/Education Provided:  Bed mobility  Energy  conservation  Functional activity tolerated  Gait training  Posture  Strengthening  Transfer training    Patient End of Session: Up in chair, Needs met, Call light within reach, RN aware of session/findings, All patient questions and concerns addressed, Hospital anti-slip socks, Family present, Discussed recommendations with /      Patient Evaluation Complexity Level:  History Moderate - 1 or 2 personal factors and/or co-morbidities   Examination of body systems Moderate - addressing a total of 3 or more elements   Clinical Presentation  Moderate - Evolving   Clinical Decision Making Moderate Complexity       PT Session Time: 30 minutes  Gait Training: 15 minutes  Therapeutic Activity:  minutes  Neuromuscular Re-education:  minutes  Therapeutic Exercise:  minutes

## 2025-03-14 LAB — MAGNESIUM SERPL-MCNC: 1.9 MG/DL (ref 1.6–2.6)

## 2025-03-14 PROCEDURE — 83735 ASSAY OF MAGNESIUM: CPT | Performed by: INTERNAL MEDICINE

## 2025-03-14 PROCEDURE — 97530 THERAPEUTIC ACTIVITIES: CPT

## 2025-03-14 PROCEDURE — 94760 N-INVAS EAR/PLS OXIMETRY 1: CPT

## 2025-03-14 PROCEDURE — 97535 SELF CARE MNGMENT TRAINING: CPT

## 2025-03-14 PROCEDURE — 97116 GAIT TRAINING THERAPY: CPT

## 2025-03-14 RX ORDER — DIAZEPAM 5 MG/1
5 TABLET ORAL EVERY 6 HOURS PRN
Status: DISCONTINUED | OUTPATIENT
Start: 2025-03-14 | End: 2025-03-15

## 2025-03-14 NOTE — OCCUPATIONAL THERAPY NOTE
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 Pt is typically completely independent with ADL/IADLs and mobility without device. Pt is active, works, drives.     ASSESSMENT   Patient demonstrates fair progress this session, goals remain in progress.    Patient continues to function below baseline with  ADLs and functional transfers .   Contributing factors to remaining limitations include decreased functional strength, decreased functional reach, decreased endurance, pain, impaired standing balance, impaired coordination, impaired motor planning, decreased muscular endurance, decreased insight to deficits, and decreased safety awareness.  Next session anticipate patient to progress toileting, lower body dressing, bed mobility, transfers, static standing balance, dynamic standing balance, functional standing tolerance, and energy conservation strategies.  Occupational Therapy will continue to follow patient for duration of hospitalization.    Patient continues to benefit from continued skilled OT services: to facilitate return to prior level of function as patient demonstrates high motivation with excellent tolerance to an intensive therapy program. Pt is adamant about wanting to go home. Pt educated on day rehab option to maximize potential to return to PLOF vs HHC or OP.    History: Patient is a 50 year old female admitted on 3/12/2025 from home for new onset of  upper/lower extremity weakness and ataxia. Pt had OP C4-6 CDR on 3/12.      Co-Morbidities : s/p C4-C6 CDR as an OP 3/12     Imaging:  MRI cervical spine 3/12  1. Expected postoperative changes are noted at C4-C5 and C5-C6 with fluid signal within the disc space surrounding the disc space device and also in the perispinal soft tissues anteriorly.   2. Persistent osteophytes cause narrowing of the central canal at the fused levels.  Within the limits of this study there is no significant cord signal abnormality.     WEIGHT BEARING RESTRICTION       Recommendations for nursing staff:   Transfers: 1 person  Toileting location: toilet    TREATMENT SESSION:  Patient Start of Session: semi supine in bed    FUNCTIONAL TRANSFER ASSESSMENT  Sit to Stand: Edge of Bed  Edge of Bed: Minimal Assist  Toilet Transfer: Minimal Assist    BED MOBILITY  Supine to Sit : Supervision  Sit to Supine (OT): Contact Guard Assist  Scooting: CGA    BALANCE ASSESSMENT  Static Sitting: Stand-by Assist  Static Standing: Moderate Assist    FUNCTIONAL ADL ASSESSMENT  Grooming Standing: Moderate Assist  LB Dressing Seated: Moderate Assist  LB Dressing Standing: Contact Guard Assist  Toileting Seated: Stand-by Assist  Toileting Standing: Minimal Assist    ACTIVITY TOLERANCE: fair-WFL                         O2 SATURATIONS       EDUCATION PROVIDED  Patient Education : Role of Occupational Therapy; Plan of Care; Discharge Recommendations; Functional Transfer Techniques; Fall Prevention; Surgical Precautions; Posture/Positioning; Energy Conservation; Proper Body Mechanics  Patient's Response to Education: Verbalized Understanding; Requires Further Education  Family/Caregiver Education : Plan of Care; Role of Occupational Therapy  Family/Caregiver's Response to Education: Verbalized Understanding    Equipment used: RW  Demonstrates functional use, Would benefit from additional trial      Therapist comments: Pt received semi supine in bed, pleasant and  cooperative for OT session. Pt educated on role of OT and importance of adhering to spine precautions. Pt with good functional reaching in long sitting to reach for socks.Pt performs bed mobility at supervision to sit EOB with HOB significantly elevated and increased time required. Sit to stand transfer performed at min A and pt ambulates to toilet with RW requiring min-mod A and increased time. Raised toilet seat placed over toilet and pt completes toileting in sitting at SBA and min A in standing for thoroughness. Pt requires mod A to initiate LB dressing: threading through B LE and min-CGA in standing to advance up past hips. Pt then completes hand washing and oral care hygiene while standing at sink requiring min-mod A for standing balance. Noted pt significantly leaning onto sink for balance. Pt transfers to bedside chair for seated rest break, then engages in short distance functional mobility with RW and close chair follow. See PT note for gait training. Pt requests to return to bed with pt requiring min A-CGA.     Long discussion with pt about current functional status with mobility and ADLs in relation to discharge recs. Pt is adamant about wanting to go home vs intense rehab. Pt educated on day rehab option to maximize potential to return to PLOF vs HHC or OP.    Patient End of Session: In bed, Needs met, Call light within reach, RN aware of session/findings, All patient questions and concerns addressed, Hospital anti-slip socks, Alarm set    SUBJECTIVE  \"My son is a senior and I don't want to miss out on anything.\"    PAIN ASSESSMENT  Rating: Unable to rate  Location: low back and neck  Management Techniques: Activity promotion, Body mechanics, Breathing techniques     OBJECTIVE  Precautions: Spine    AM-PAC ‘6-Clicks’ Inpatient Daily Activity Short Form  -   Putting on and taking off regular lower body clothing?: A Lot  -   Bathing (including washing, rinsing, drying)?: A Little  -   Toileting, which  includes using toilet, bedpan or urinal? : A Little  -   Putting on and taking off regular upper body clothing?: A Little  -   Taking care of personal grooming such as brushing teeth?: A Little  -   Eating meals?: A Little    AM-PAC Score:  Score: 17  Approx Degree of Impairment: 50.11%  Standardized Score (AM-PAC Scale): 37.26    PLAN  OT Device Recommendations: TBD  OT Treatment Plan: Balance activities, ADL training, Functional transfer training, UE strengthening/ROM, Patient/Family education, Patient/Family training, Equipment eval/education, Neuromuscluar reeducation, Fine motor coordination activities, Compensatory technique education  Rehab Potential : Good  Frequency: 5x/week    OT Goals:     All goals ongoing 03/14    ADL Goals   Patient will perform eating: with set up  Patient will perform grooming: with setup  Patient will perform upper body dressing:  with setup  Patient will perform lower body dressing:  with supervision  Patient will perform toileting: with supervision     Functional Transfer Goals  Patient will transfer from supine to sit:  with stand by assist  Patient will transfer from sit to stand:  with stand by assist  Patient will transfer to toilet:  with supervision     UE Exercise Program Goal  Patient will be supervision with bilateral coordination HEP (home exercise program).     Additional Goals  Pt will tolerate standing for functional task x5 mins with supervision     Therapist Goals  Complete PhQ9    OT Session Time: 40 minutes  Self-Care Home Management: 40 minutes

## 2025-03-14 NOTE — PROGRESS NOTES
[facility] Hospitalist Progress Note       SUBJECTIVE:   Pt feels oky has some numbness in her hands  Able to walk with walker today    OBJECTIVE:  Scheduled Meds:    famotidine  20 mg Oral Daily     Continuous Infusions:   PRN Meds:   oxyCODONE-acetaminophen **OR** oxyCODONE-acetaminophen    diazePAM    HYDROmorphone **OR** HYDROmorphone **OR** HYDROmorphone    acetaminophen **OR** [DISCONTINUED] HYDROcodone-acetaminophen **OR** [DISCONTINUED] HYDROcodone-acetaminophen    Vitals  Vitals:    03/14/25 0830   BP: (!) 131/96   Pulse: 53   Resp: 18   Temp: 98.1 °F (36.7 °C)         Exam   Gen-    no acute distress, alert and oriented x 3   RESP-   Lungs CTA, normal respiratory effort  CV-      Heart RRR, no mgr  Abd-    soft, nondistended, nontender, bowel sounds present  Skin-    no rash  Neuro-  no focal neurologic deficits  Ext-      No edema in extremities   Psych- alert and oriented x 3     Labs:     Recent Labs   Lab 03/12/25  1150 03/13/25  0438   WBC 9.7 14.4*   HGB 12.1 11.8*   MCV 88.5 87.7   .0 341.0       Recent Labs   Lab 03/12/25  1150 03/13/25  0438 03/14/25  0445    142  --    K 3.6 4.8  --     108  --    CO2 20.0* 26.0  --    BUN 8* 8*  --    CREATSERUM 0.69 0.67  --    CA 8.8 9.2  --    MG  --  1.8 1.9   * 135*  --        Recent Labs   Lab 03/12/25  1150   ALT 15   AST 28   ALB 4.4       No results for input(s): \"PGLU\" in the last 168 hours.    AP:  A/P: 49 y/o F w/ no major PMHx who presents after outpatient cervical spine surgery where she underwent a C4-C5, C5-C6 Anterior Cervical Discectomy and cervical disk replacement. Presenting for upper/lower extremity weakness/ataxia     Ataxia/Parasthesias  Hx of Cervical Spine surgery 3/12/25  CT/MRIs noted, no major abnormalities  Plan:  - Spine surgery on consult, recommending IV steroids, Decadron 10mg q8h scheduled - completed   - Pepcid started while on steroids  - PRN pain control  - PT/OT evaluations     DVT Ppx: no DVT until POD5  as per Spine - around 3/17           Jacqueline Bertrand MD

## 2025-03-14 NOTE — PHYSICAL THERAPY NOTE
PHYSICAL THERAPY TREATMENT NOTE - INPATIENT    Room Number: 376/376-A     Session: 1     Number of Visits to Meet Established Goals: 5    Presenting Problem: UE/LE weakness  Co-Morbidities : s/p C4-C6 CDR as an OP 3/12    PHYSICAL THERAPY ASSESSMENT   Patient demonstrates fair progress this session, goals  remain in progress.      Patient is requiring contact guard assist, minimal assist, and moderate assist as a result of the following impairments: decreased functional strength, pain, impaired standing  balance, impaired coordination, impaired motor planning, decreased muscular endurance, and difficulty maintaining precautions.     Patient continues to function below baseline with bed mobility, transfers, gait, stair negotiation, standing prolonged periods, and performing household tasks.  Next session anticipate patient to progress transfers, gait, stair negotiation, and standing prolonged periods.  Physical Therapy will continue to follow patient for duration of hospitalization.    Patient continues to benefit from continued skilled PT services: to facilitate return to prior level of function as patient demonstrates high motivation with excellent tolerance to an intensive therapy program .    PLAN DURING HOSPITALIZATION  Nursing Mobility Recommendation : 1 Assist  PT Device Recommendation: Rolling walker  PT Treatment Plan: Bed mobility, Endurance, Energy conservation, Patient education, Gait training, Strengthening, Neuromuscular re-educate, Balance training, Transfer training, Stair training  Frequency (Obs): Daily     CURRENT GOALS       Goal #1 Patient is able to demonstrate supine - sit EOB @ level: minimum assistance      Goal #2 Patient is able to demonstrate transfers EOB to/from C at assistance level: supervision      Goal #3 Patient is able to ambulate 100 feet with assist device: walker - rolling at assistance level: minimum assistance      Goal #4 Pt will demonstrate good understanding of spine  precautions    Goal #5     Goal #6     Goal Comments: Goals established on 3/13/2025  3/14/2025 all goals ongoing    SUBJECTIVE  \"I guess I'm an anomaly\"     OBJECTIVE  Precautions: Spine    WEIGHT BEARING RESTRICTION     PAIN ASSESSMENT   Rating: Unable to rate  Location: neck and shoulders  Management Techniques: Activity promotion, Body mechanics, Breathing techniques, Relaxation, Repositioning    BALANCE                                                                                                                       Static Sitting: Fair -  Dynamic Sitting: Fair -           Static Standing: Poor +  Dynamic Standing: Poor +    ACTIVITY TOLERANCE                         O2 WALK       AM-PAC '6-Clicks' INPATIENT SHORT FORM - BASIC MOBILITY  How much difficulty does the patient currently have...  Patient Difficulty: Turning over in bed (including adjusting bedclothes, sheets and blankets)?: A Little   Patient Difficulty: Sitting down on and standing up from a chair with arms (e.g., wheelchair, bedside commode, etc.): A Little   Patient Difficulty: Moving from lying on back to sitting on the side of the bed?: A Little   How much help from another person does the patient currently need...   Help from Another: Moving to and from a bed to a chair (including a wheelchair)?: A Little   Help from Another: Need to walk in hospital room?: A Little   Help from Another: Climbing 3-5 steps with a railing?: A Lot     AM-PAC Score:  Raw Score: 17   Approx Degree of Impairment: 50.57%   Standardized Score (AM-PAC Scale): 42.13   CMS Modifier (G-Code): CK    FUNCTIONAL ABILITY STATUS  Gait Assessment   Functional Mobility/Gait Assessment  Gait Assistance: Minimum assistance  Distance (ft): 12,35  Assistive Device: Rolling walker  Pattern: L Steppage, R Steppage, R Foot flat, L Foot flat, Ataxic    Skilled Therapy Provided  Pt presents in bed  Therapist educated pt on body mechanics, spine precautions and integration into  functional mobility   Pt requires increased time for functional mobility and demos BLE BUE weakness.   Pt requires min to mod A for safety with functional mobility, riser placed over toilet for t/f, and close CGA to min A at sink for hand and oral hygiene.  Long d/w pt re: current presentation, d/c recs, and all questions and concerns addressed   Bed Mobility:  Rolling: CGA    Supine<>Sit: min A    Sit<>Supine: min A      Transfer Mobility:  Sit<>Stand: min A    Stand<>Sit: min A    Gait: min to mod A c RW     Therapist's Comments: pt requires assist to don slippers while seated EOB  Therapist encouraged pt to have athletic shoes with closed heel brought in for further mobility training           Patient End of Session: In bed, Needs met, Call light within reach, RN aware of session/findings, All patient questions and concerns addressed, Hospital anti-slip socks, Alarm set    PT Session Time: 38 minutes  Gait Trainin minutes  Therapeutic Activity: 15 minutes  Therapeutic Exercise:  minutes   Neuromuscular Re-education: 5 minutes

## 2025-03-14 NOTE — PLAN OF CARE
A&Ox4, VSS on room air. POD#2, dressing to anterior neck. Weakness, numbness and tingling noted to all extremities. Patient requiring moderate amount of assistance to ambulate. Up in chair, ambulating to bathroom. Plan to continue pain control and monitor strength.

## 2025-03-14 NOTE — PROGRESS NOTES
[facility]   part of [facility]    Progress Note    Brigitte Ac Patient Status:  Inpatient    1975 MRN [id number]   Location [facility] 3SW-A Attending Jacqueline Bertrand MD   Hosp Day # 1 PCP Hu Orozco DO     S:   Patient notes posterior neck and shoulder pain along with continued bilateral upper and lower extremity numbness, tingling, and weakness. She has been up and moving with PT. She notes no improvement in her symptoms. States that they have not worsened.     Inspection: Awake Alert  No acute distress.     Blood pressure (!) 131/96, pulse 53, temperature 98.1 °F (36.7 °C), temperature source Oral, resp. rate 18, height 5' 4\" (1.626 m), weight 114 lb (51.7 kg), last menstrual period 2025, SpO2 100%, not currently breastfeeding.    Recent Labs   Lab 25  0438   RBC 3.91   HGB 11.8*   HCT 34.3*   MCV 87.7   MCH 30.2   MCHC 34.4   RDW 11.9   NEPRELIM 11.77*   WBC 14.4*   .0       Incision:  Dressing in place, Wound/s well approximated, no erythema or drainage     Neck supple      Strength testing:   Left   Right   5/5 Deltoid  5/5 Deltoid    5/5 Biceps  5/5 Biceps   5/5 Ext Rot  5/5 Ext Rot   5/5 Triceps  5/5 Triceps   3/5    3/5    3/5 Intrinsics  3/5 Intrinsics    Lower extremities: 5/5 bilateral hip flexors, quads, and plantar flexion  4/5 bilateral EHL  4-/5 bilateral DF       Sensation: Normal sensation noted to light touch and pressure throughout bilateral upper extremities.    Calves supple NT bilaterally    A/P: POD # 2 s/p C4-C6 CDR    P: Patient is doing well overall. Continued upper and lower extremity radicular symptoms. Slight improvement in foot weakness compared to yesterday. Continue to monitor. Continue with PT/OT. Continue to get up and ambulate at least three times per day.     Bree Blunt PA-C   25

## 2025-03-14 NOTE — PLAN OF CARE
Alert and oriented x 4. VSS; on room air. Pain controlled with PRN medication. Voiding without difficulty. Up min assist x1. On IV steroids.  POC discussed with patient. Safety precautions in place. Call light within reach.

## 2025-03-15 PROCEDURE — 97530 THERAPEUTIC ACTIVITIES: CPT

## 2025-03-15 RX ORDER — DIAZEPAM 10 MG/1
10 TABLET ORAL EVERY 6 HOURS PRN
Status: DISCONTINUED | OUTPATIENT
Start: 2025-03-15 | End: 2025-03-19

## 2025-03-15 NOTE — PLAN OF CARE
Assumed care of pt at 1930, pt remains with weakness to upper and lower ext with numbness and tingling as well. Per pt weakness has improved in her legs but not her hands. N/t to hands only per pt and n/t from feet to just below her knees. Pt c/o pain to her posterior neck and bilateral shoulders and medicated with some relief. Pt resting comfortably at present with  at bedside. All questions and concerns addressed. Adelfo ansari.   Fall

## 2025-03-15 NOTE — PROGRESS NOTES
[facility] Hospitalist Progress Note       SUBJECTIVE:  Having some post op pain  No nausea no vomiting  Some numbness her hands still     OBJECTIVE:  Scheduled Meds:    famotidine  20 mg Oral Daily     Continuous Infusions:   PRN Meds:   diazePAM    oxyCODONE-acetaminophen **OR** oxyCODONE-acetaminophen    HYDROmorphone **OR** HYDROmorphone **OR** HYDROmorphone    acetaminophen **OR** [DISCONTINUED] HYDROcodone-acetaminophen **OR** [DISCONTINUED] HYDROcodone-acetaminophen    Vitals  Vitals:    03/15/25 0500   BP: 118/88   Pulse: 50   Resp: 18   Temp: 98.4 °F (36.9 °C)         Exam   Gen-    no acute distress, alert and oriented x 3   RESP-   Lungs CT, normal respiratory effort  CV-      Heart RRR, no mgr  Abd-    soft, nondistended, nontender, bowel sounds present  Skin-    no rash  Neuro-  no focal neurologic deficits  Ext-      No edema in extremities   Psych- alert and oriented x 3     Labs:     Recent Labs   Lab 03/12/25  1150 03/13/25  0438   WBC 9.7 14.4*   HGB 12.1 11.8*   MCV 88.5 87.7   .0 341.0       Recent Labs   Lab 03/12/25  1150 03/13/25  0438 03/14/25  0445    142  --    K 3.6 4.8  --     108  --    CO2 20.0* 26.0  --    BUN 8* 8*  --    CREATSERUM 0.69 0.67  --    CA 8.8 9.2  --    MG  --  1.8 1.9   * 135*  --        Recent Labs   Lab 03/12/25  1150   ALT 15   AST 28   ALB 4.4       No results for input(s): \"PGLU\" in the last 168 hours.    AP:    51 y/o F w/ no major PMHx who presents after outpatient cervical spine surgery where she underwent a C4-C5, C5-C6 Anterior Cervical Discectomy and cervical disk replacement. Presenting for upper/lower extremity weakness/ataxia     Ataxia/Parasthesias  Hx of Cervical Spine surgery 3/12/25  CT/MRIs noted, no major abnormalities  Plan:  - Spine surgery on consult, recommending IV steroids, Decadron 10mg q8h scheduled - completed   - Pepcid started while on steroids  - PRN pain control  - PT/OT evaluations     DVT Ppx: no DVT until POD5 as  per Spine - around 3/17            Jacqueline Bertrand MD

## 2025-03-15 NOTE — PROGRESS NOTES
[facility]   part of [facility]    Progress Note    Brigitte Ac Patient Status:  Inpatient    1975 MRN [id number]   Location [facility] 3SW-A Attending Jacqueline Bertrand MD   Hosp Day # 2 PCP Hu Orozco DO     S:   Patient notes posterior neck and shoulder pain along with continued bilateral upper and lower extremity numbness, tingling, and weakness. She has been up and moving with PT.   Update 3/15/2025: patients states feeling the same.     Inspection: Awake Alert  No acute distress.     Blood pressure 135/88, pulse 69, temperature 97.5 °F (36.4 °C), temperature source Oral, resp. rate 18, height 5' 4\" (1.626 m), weight 114 lb (51.7 kg), last menstrual period 2025, SpO2 96%, not currently breastfeeding.    Recent Labs   Lab 25  0438   RBC 3.91   HGB 11.8*   HCT 34.3*   MCV 87.7   MCH 30.2   MCHC 34.4   RDW 11.9   NEPRELIM 11.77*   WBC 14.4*   .0       Incision:  Dressing in place, Wound/s well approximated, no erythema or drainage     Neck supple      Strength testing:   Left   Right   5/5 Deltoid  5/5 Deltoid    5/5 Biceps  5/5 Biceps   5/5 Ext Rot  5/5 Ext Rot   5/5 Triceps  5/5 Triceps   3/5    3/5    3/5 Intrinsics  3/5 Intrinsics    Lower extremities: 5/5 bilateral hip flexors, quads, and plantar flexion  4/5 bilateral EHL  4-/5 bilateral DF       Sensation: Normal sensation noted to light touch and pressure throughout bilateral upper extremities.    Calves supple NT bilaterally    A/P: POD # 3  s/p C4-C6 CDR    P: Patient is doing well overall. Continued upper and lower extremity radicular symptoms. Continue to monitor. Continue with PT/OT. Continue to get up and ambulate at least three times per day.     Sally Parkinson PA-C

## 2025-03-15 NOTE — OCCUPATIONAL THERAPY NOTE
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 Pt is typically completely independent with ADL/IADLs and mobility without device. Pt is active, works, drives.     ASSESSMENT   Patient demonstrates fair progress this session, goals remain in progress.    Patient continues to function below baseline with  ADLs and functional transfers .   Contributing factors to remaining limitations include decreased functional strength, decreased functional reach, decreased endurance, pain, impaired standing balance, impaired coordination, impaired motor planning, decreased muscular endurance, decreased insight to deficits, and decreased safety awareness.  Next session anticipate patient to progress toileting, lower body dressing, bed mobility, transfers, static standing balance, dynamic standing balance, functional standing tolerance, and energy conservation strategies.  Occupational Therapy will continue to follow patient for duration of hospitalization.    Patient continues to benefit from continued skilled OT services: to facilitate return to prior level of function as patient demonstrates high motivation with excellent tolerance to an intensive therapy program. Pt is adamant about wanting to go home. Pt educated on day rehab option to maximize potential to return to PLOF vs HHC or OP.    History: Patient is a 50 year old female admitted on 3/12/2025 from home for new onset of upper/lower  extremity weakness and ataxia. Pt had OP C4-6 CDR on 3/12.      Co-Morbidities : s/p C4-C6 CDR as an OP 3/12     Imaging:  MRI cervical spine 3/12  1. Expected postoperative changes are noted at C4-C5 and C5-C6 with fluid signal within the disc space surrounding the disc space device and also in the perispinal soft tissues anteriorly.   2. Persistent osteophytes cause narrowing of the central canal at the fused levels.  Within the limits of this study there is no significant cord signal abnormality.     WEIGHT BEARING RESTRICTION       Recommendations for nursing staff:   Transfers: 1 person  Toileting location: toilet    TREATMENT SESSION:  Patient Start of Session: semi supine in bed; friend present at bedside    FUNCTIONAL TRANSFER ASSESSMENT  Sit to Stand: Edge of Bed  Edge of Bed: Contact Guard Assist  Toilet Transfer: Not Tested    BED MOBILITY  Supine to Sit : Supervision  Sit to Supine (OT): Supervision  Scooting: CGA    BALANCE ASSESSMENT  Static Sitting: Stand-by Assist  Static Standing: Minimal Assist    FUNCTIONAL ADL ASSESSMENT  Grooming Standing: Not Tested  LB Dressing Seated: Not Tested  LB Dressing Standing: Not Tested  Toileting Seated: Not Tested  Toileting Standing: Not Tested    ACTIVITY TOLERANCE: fair-WFL                         O2 SATURATIONS       EDUCATION PROVIDED  Patient Education : Role of Occupational Therapy; Plan of Care; Discharge Recommendations; Functional Transfer Techniques; Fall Prevention; Surgical Precautions; Posture/Positioning; Energy Conservation; Proper Body Mechanics  Patient's Response to Education: Verbalized Understanding; Requires Further Education  Family/Caregiver Education : Plan of Care; Role of Occupational Therapy  Family/Caregiver's Response to Education: Verbalized Understanding    Equipment used: RW  Demonstrates functional use, Would benefit from additional trial      Therapist comments: Pt received semi supine in bed, pleasant and cooperative for OT  session. Pt re-educated on spinal precautions. Pt with good functional reaching in long sitting to reach for socks while adhering to spinal precautions. Pt performs bed mobility at supervision to sit EOB with HOB elevated. Sit to stand transfer performed at [employer] and pt engages in functional mobility with RW requiring min A. Cues provided for posture/postioning (pt with heavy B UE reliance on RW). Pt returns to room and transfers back into bed at supervision. Good carryover of log rolling back into bed.    Continued with long discussion with pt about current functional status with mobility and ADLs in relation to discharge recs. Pt is still adamant about wanting to go home vs intense rehab. Pt educated on day rehab therapy. Pt initially not open to idea of day rehab and asking if home health is an alternative. Pt educated on level of care provided with  vs [facility]. Further education provided that  has more resources and equipment to maximize pt's potential to reach PLOF vs receiving HH therapy. Pt continues to be hesitant but would be open to . SW made aware.    Patient End of Session: In bed, Needs met, Call light within reach, RN aware of session/findings, All patient questions and concerns addressed, Hospital anti-slip socks, Alarm set, Other (Comment) (friend present)    SUBJECTIVE  \"I really don't want to go to an overnight hospital.\" Re: acute rehab    PAIN ASSESSMENT  Ratin  Location: denies  Management Techniques: Activity promotion, Body mechanics, Breathing techniques     OBJECTIVE  Precautions: Spine    AM-PAC ‘6-Clicks’ Inpatient Daily Activity Short Form  -   Putting on and taking off regular lower body clothing?: A Lot  -   Bathing (including washing, rinsing, drying)?: A Little  -   Toileting, which includes using toilet, bedpan or urinal? : A Little  -   Putting on and taking off regular upper body clothing?: A Little  -   Taking care of personal grooming such as brushing teeth?: A Little  -    Eating meals?: A Little    AM-PAC Score:  Score: 17  Approx Degree of Impairment: 50.11%  Standardized Score (AM-PAC Scale): 37.26    PLAN  OT Device Recommendations: TBD  OT Treatment Plan: Balance activities, ADL training, Functional transfer training, UE strengthening/ROM, Patient/Family education, Patient/Family training, Equipment eval/education, Neuromuscluar reeducation, Fine motor coordination activities, Compensatory technique education  Rehab Potential : Good  Frequency: 5x/week    OT Goals:     All goals ongoing 03/15    ADL Goals   Patient will perform eating: with set up  Patient will perform grooming: with setup  Patient will perform upper body dressing:  with setup  Patient will perform lower body dressing:  with supervision  Patient will perform toileting: with supervision     Functional Transfer Goals  Patient will transfer from supine to sit:  with stand by assist  Patient will transfer from sit to stand:  with stand by assist  Patient will transfer to toilet:  with supervision     UE Exercise Program Goal  Patient will be supervision with bilateral coordination HEP (home exercise program).     Additional Goals  Pt will tolerate standing for functional task x5 mins with supervision     Therapist Goals  Complete PhQ9    OT Session Time: 45 minutes  Therapeutic Activity: 45 minutes

## 2025-03-15 NOTE — PLAN OF CARE
Pt A&Ox4. VSS on RA. . Scd's on bilaterally. Surgical dressing to anterior neck intact. Heat compress and PRN valium given for left shoulder pain. Numbness and tingling present to BUE and BLE. Up min assist with walker and GB, unsteady gait. Pt declining rehab at this time. Dtr at bedside. Call light within reach. Will continue to monitor.

## 2025-03-16 LAB
ANION GAP SERPL CALC-SCNC: 8 MMOL/L (ref 0–18)
BASOPHILS # BLD AUTO: 0.07 X10(3) UL (ref 0–0.2)
BASOPHILS NFR BLD AUTO: 0.7 %
BUN BLD-MCNC: 8 MG/DL (ref 9–23)
CALCIUM BLD-MCNC: 9.8 MG/DL (ref 8.7–10.6)
CHLORIDE SERPL-SCNC: 104 MMOL/L (ref 98–112)
CO2 SERPL-SCNC: 30 MMOL/L (ref 21–32)
CREAT BLD-MCNC: 0.7 MG/DL
EGFRCR SERPLBLD CKD-EPI 2021: 104 ML/MIN/1.73M2 (ref 60–?)
EOSINOPHIL # BLD AUTO: 0.41 X10(3) UL (ref 0–0.7)
EOSINOPHIL NFR BLD AUTO: 4.3 %
ERYTHROCYTE [DISTWIDTH] IN BLOOD BY AUTOMATED COUNT: 11.9 %
GLUCOSE BLD-MCNC: 109 MG/DL (ref 70–99)
HCT VFR BLD AUTO: 37.2 %
HGB BLD-MCNC: 12.8 G/DL
IMM GRANULOCYTES # BLD AUTO: 0.06 X10(3) UL (ref 0–1)
IMM GRANULOCYTES NFR BLD: 0.6 %
LYMPHOCYTES # BLD AUTO: 3.39 X10(3) UL (ref 1–4)
LYMPHOCYTES NFR BLD AUTO: 35.8 %
MCH RBC QN AUTO: 30.5 PG (ref 26–34)
MCHC RBC AUTO-ENTMCNC: 34.4 G/DL (ref 31–37)
MCV RBC AUTO: 88.8 FL
MONOCYTES # BLD AUTO: 0.63 X10(3) UL (ref 0.1–1)
MONOCYTES NFR BLD AUTO: 6.7 %
NEUTROPHILS # BLD AUTO: 4.9 X10 (3) UL (ref 1.5–7.7)
NEUTROPHILS # BLD AUTO: 4.9 X10(3) UL (ref 1.5–7.7)
NEUTROPHILS NFR BLD AUTO: 51.9 %
OSMOLALITY SERPL CALC.SUM OF ELEC: 293 MOSM/KG (ref 275–295)
PLATELET # BLD AUTO: 389 10(3)UL (ref 150–450)
POTASSIUM SERPL-SCNC: 3.6 MMOL/L (ref 3.5–5.1)
RBC # BLD AUTO: 4.19 X10(6)UL
SODIUM SERPL-SCNC: 142 MMOL/L (ref 136–145)
WBC # BLD AUTO: 9.5 X10(3) UL (ref 4–11)

## 2025-03-16 PROCEDURE — 97116 GAIT TRAINING THERAPY: CPT

## 2025-03-16 PROCEDURE — 80048 BASIC METABOLIC PNL TOTAL CA: CPT | Performed by: INTERNAL MEDICINE

## 2025-03-16 PROCEDURE — 97530 THERAPEUTIC ACTIVITIES: CPT

## 2025-03-16 PROCEDURE — 85025 COMPLETE CBC W/AUTO DIFF WBC: CPT | Performed by: INTERNAL MEDICINE

## 2025-03-16 NOTE — PROGRESS NOTES
[facility]   part of [facility]    Progress Note    Brigitte Ac Patient Status:  Inpatient    1975 MRN [id number]   Location [facility] 3SW-A Attending Jacqueline Bertrand MD   Hosp Day # 3 PCP Hu Orozco DO     S:   Patient notes posterior neck and shoulder pain along with continued bilateral upper and lower extremity numbness, tingling, and weakness. She has been up and moving with PT.   Update 3/15/2025: patients states feeling the same.   Update 3/16/2025: patients states feeling the same.     Inspection: Awake Alert  No acute distress.     Blood pressure (!) 126/94, pulse 75, temperature 97.6 °F (36.4 °C), temperature source Oral, resp. rate 20, height 5' 4\" (1.626 m), weight 114 lb (51.7 kg), last menstrual period 2025, SpO2 98%, not currently breastfeeding.    Recent Labs   Lab 25  0438   RBC 3.91   HGB 11.8*   HCT 34.3*   MCV 87.7   MCH 30.2   MCHC 34.4   RDW 11.9   NEPRELIM 11.77*   WBC 14.4*   .0       Incision:  Dressing in place, Wound/s well approximated, no erythema or drainage     Neck supple      Strength testing:   Left   Right   5/5 Deltoid  5/5 Deltoid    5/5 Biceps  5/5 Biceps   5/5 Ext Rot  5/5 Ext Rot   5/5 Triceps  5/5 Triceps   3/5    3/5    3/5 Intrinsics  3/5 Intrinsics    Lower extremities: 5/5 bilateral hip flexors, quads, and plantar flexion  4/5 bilateral EHL  4-/5 bilateral DF       Sensation: Normal sensation noted to light touch and pressure throughout bilateral upper extremities.    Calves supple NT bilaterally    A/P: POD # 5  s/p C4-C6 CDR    P: Patient is doing well overall. Continued upper and lower extremity radicular symptoms. Continue to monitor. Continue with PT/OT. Continue to get up and ambulate at least three times per day.     Sally Parkinson PA-C

## 2025-03-16 NOTE — PLAN OF CARE
Pt A&Ox4. VSS on RA. . Scd's on bilaterally. Surgical dressing to anterior neck changed to 4x4 gauze and Tegaderm. Left shoulder pain managed with prn valium. Numbness and tingling remains present to BUE and BLE. Up min assist with walker and GB. Pt declining rehab at this time. PT/OT. Call light within reach. Will continue to monitor.

## 2025-03-16 NOTE — PLAN OF CARE
Aox4, reporting continued decreased sensation + tingling to all extremities, no dysphagia, on room air , scds in place, pain medication given as ordered, up min assist with GB + RW unsteady on feet at times, plan for dc TBD, safety precautions in place, no further needs at this time.

## 2025-03-16 NOTE — PROGRESS NOTES
[facility] Hospitalist Progress Note       SUBJECTIVE:  Pt still has tingling in her hands   Trying to decide on rehab vs home     OBJECTIVE:  Scheduled Meds:    famotidine  20 mg Oral Daily     Continuous Infusions:   PRN Meds:   diazePAM    oxyCODONE-acetaminophen **OR** oxyCODONE-acetaminophen    HYDROmorphone **OR** HYDROmorphone **OR** HYDROmorphone    acetaminophen **OR** [DISCONTINUED] HYDROcodone-acetaminophen **OR** [DISCONTINUED] HYDROcodone-acetaminophen    Vitals  @3VITALS@     Exam   Gen-    no acute distress, alert and oriented x 3   RESP-   Lungs CTA, normal respiratory effort  CV-      Heart RRR, no mgr  Abd-    soft, nondistended, nontender, bowel sounds present  Skin-    no rash  Neuro-  no focal neurologic deficits  Ext-      No edema in extremities   Psych- alert and oriented x 3     Labs:     Recent Labs   Lab 03/12/25  1150 03/13/25  0438   WBC 9.7 14.4*   HGB 12.1 11.8*   MCV 88.5 87.7   .0 341.0       Recent Labs   Lab 03/12/25  1150 03/13/25  0438 03/14/25  0445    142  --    K 3.6 4.8  --     108  --    CO2 20.0* 26.0  --    BUN 8* 8*  --    CREATSERUM 0.69 0.67  --    CA 8.8 9.2  --    MG  --  1.8 1.9   * 135*  --        Recent Labs   Lab 03/12/25  1150   ALT 15   AST 28   ALB 4.4       No results for input(s): \"PGLU\" in the last 168 hours.    AP:  51 y/o F w/ no major PMHx who presents after outpatient cervical spine surgery where she underwent a C4-C5, C5-C6 Anterior Cervical Discectomy and cervical disk replacement. Presenting for upper/lower extremity weakness/ataxia     Ataxia/Parasthesias  Hx of Cervical Spine surgery 3/12/25  CT/MRIs noted, no major abnormalities  Plan:  - Spine surgery on consult, recommending IV steroids, Decadron 10mg q8h scheduled - completed   - Pepcid started while on steroids  - PRN pain control  - PT/OT evaluations     DVT Ppx: no DVT until POD5 as per Spine - around 3/17             Jacqueline Bertrand MD

## 2025-03-16 NOTE — PHYSICAL THERAPY NOTE
PHYSICAL THERAPY TREATMENT NOTE - INPATIENT    Room Number: 376/376-A     Session: 2     Number of Visits to Meet Established Goals: 5    Presenting Problem: UE/LE weakness  Co-Morbidities : s/p C4-C6 CDR as an OP 3/12/2025    HOME SITUATION  Type of Home: House  Home Layout: Two level, Able to live on main level  Stairs to Enter : 2      Lives With: Spouse, Family    Drives: Yes   Patient Regularly Uses: None       Prior Level of [place]: Pt lives with spouse and 2 children (13 and 18 y.o) in 2 story home. Primary bed/bath is on the main level of the home. Pt active and independent at baseline.        PHYSICAL THERAPY ASSESSMENT   Patient demonstrates good  progress this session, goals  updated to reflect patient performance.      Patient is requiring contact guard assist and minimal assist as a result of the following impairments: decreased functional strength, decreased endurance/aerobic capacity, pain, impaired dynamic balance, impaired coordination, impaired motor planning, and decreased muscular endurance.     Patient continues to function below baseline with bed mobility, transfers, gait, stair negotiation, standing prolonged periods, and performing household tasks.  Next session anticipate patient to progress transfers, gait, stair negotiation, and standing prolonged periods.  Physical Therapy will continue to follow patient for duration of hospitalization.    Patient continues to benefit from continued skilled PT services: to facilitate return to prior level of function as patient demonstrates high motivation with excellent tolerance to an intensive therapy program .    PLAN DURING HOSPITALIZATION  Nursing Mobility Recommendation : 1 Assist  PT Device Recommendation: Rolling walker  PT Treatment Plan: Bed mobility, Endurance, Energy conservation, Patient education, Gait training, Strengthening, Neuromuscular re-educate, Balance training, Transfer training, Stair training  Frequency (Obs): Daily      CURRENT GOALS     Goal #1 Patient is able to demonstrate supine - sit EOB @ level: minimum assistance   MET updated to SUPERVISION   Goal #2 Patient is able to demonstrate transfers EOB to/from C at assistance level: supervision   MET   Goal #3 Patient is able to ambulate 100 feet with assist device: walker - rolling at assistance level: minimum assistance   MET UPDATED 150 feet   Goal #4 Pt will demonstrate good understanding of spine precautions    Goal #5     Goal #6     Goal Comments: Goals established on 3/13/2025  3/14/2025 all goals ongoing  3/16/2025 goals have been updated    SUBJECTIVE  \" My hands and feet still feel weird\"    OBJECTIVE  Precautions: Spine      PAIN ASSESSMENT   Ratin  Location: neck  Management Techniques: Body mechanics, Breathing techniques, Relaxation, Repositioning    BALANCE                                                                                                                       Static Sitting: Fair  Dynamic Sitting: Fair           Static Standing: Fair  Dynamic Standing: Fair -    AM-PAC '6-Clicks' INPATIENT SHORT FORM - BASIC MOBILITY  How much difficulty does the patient currently have...  Patient Difficulty: Turning over in bed (including adjusting bedclothes, sheets and blankets)?: None   Patient Difficulty: Sitting down on and standing up from a chair with arms (e.g., wheelchair, bedside commode, etc.): A Little   Patient Difficulty: Moving from lying on back to sitting on the side of the bed?: None   How much help from another person does the patient currently need...   Help from Another: Moving to and from a bed to a chair (including a wheelchair)?: A Little   Help from Another: Need to walk in hospital room?: A Little   Help from Another: Climbing 3-5 steps with a railing?: A Lot     AM-PAC Score:  Raw Score: 19   Approx Degree of Impairment: 41.77%   Standardized Score (AM-PAC Scale): 45.44   CMS Modifier (G-Code): CK    FUNCTIONAL ABILITY STATUS  Gait  Assessment   Functional Mobility/Gait Assessment  Gait Assistance: Minimum assistance  Distance (ft): 110  Assistive Device: Rolling walker  Pattern: L Steppage, R Steppage, R Foot flat, L Foot flat, Ataxic    Skilled Therapy Provided    Bed Mobility:  Rolling: supervision   Supine<>Sit: supervision   Sit<>Supine: supervision     Transfer Mobility:  Sit<>Stand: min A   Stand<>Sit: min A   Gait: min A    Therapist's Comments: RN cleared pt for session. Pt received seated on bedside chair and agreeable to session, daughter present throughout session. Pt performed above functional mobility. Pt demonstrated exercises below that she has been performing throughout the day. Pt was able to recall log rolling for bed mobility. Pt upon return to room and bed, requested to use the bathroom. Pt performed bed mobility and ambulated to bathroom, performed toilet transfer, hygiene and hand hygiene by sink. Pt requires min verbal cueing for proper use of RW and technique for safety awareness. Pt tends to hit foot on one of the back legs of RW. Pt demonstrates good return. Pt returned to bed after bathroom use. RN made aware of session and recommendations.       THERAPEUTIC EXERCISES  Lower Extremity Alternating marching  Ankle pumps  LAQ     Upper Extremity Elbow flex/ext,  - open/close, Shoulder flex/ext, and finger opposition     Position Sitting     Repetitions   10   Sets   1     Patient End of Session: In bed, Needs met, Call light within reach, RN aware of session/findings, All patient questions and concerns addressed, Family present    PT Session Time: 25 minutes  Gait Training: 15 minutes  Therapeutic Activity: 10 minutes  Therapeutic Exercise:  minutes   Neuromuscular Re-education:  minutes

## 2025-03-17 PROCEDURE — 97535 SELF CARE MNGMENT TRAINING: CPT

## 2025-03-17 RX ORDER — CELECOXIB 100 MG/1
100 CAPSULE ORAL EVERY 12 HOURS
Status: DISCONTINUED | OUTPATIENT
Start: 2025-03-17 | End: 2025-03-19

## 2025-03-17 NOTE — CM/SW NOTE
Informed that pt/family now agreeable with acute rehab at discharge.  PMR consulted.  Updated Barb from  regarding new consult.  Referral sent to [facility] facilities via [facility].  Await PMR recommendations.  Insurance auth will be needed.  / to remain available for support and/or discharge planning.     Mallory Starkey, [facility]  Discharge Planner  [phone]

## 2025-03-17 NOTE — OCCUPATIONAL THERAPY NOTE
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 Pt is typically completely independent with ADL/IADLs and mobility without device. Pt is active, works, drives.     ASSESSMENT   Patient demonstrates good  progress this session, goals remain in progress.    Patient continues to function below baseline with  ADLs and functional transfers .   Contributing factors to remaining limitations include decreased functional strength, decreased functional reach, decreased endurance, pain, impaired standing balance, impaired coordination, impaired motor planning, decreased muscular endurance, decreased insight to deficits, and decreased safety awareness.  Next session anticipate patient to progress toileting, lower body dressing, bed mobility, transfers, static standing balance, dynamic standing balance, functional standing tolerance, and energy conservation strategies.  Occupational Therapy will continue to follow patient for duration of hospitalization.    Patient continues to benefit from continued skilled OT services: to facilitate return to prior level of function as patient demonstrates high motivation with excellent tolerance to an intensive therapy program.    History: Patient is a 50 year old female admitted on 3/12/2025 from home for new onset of upper/lower extremity weakness and ataxia. Pt had OP C4-6 CDR on 3/12.      Co-Morbidities : s/p C4-C6 CDR as an OP 3/12      Imaging:  MRI cervical spine 3/12  1. Expected postoperative changes are noted at C4-C5 and C5-C6 with fluid signal within the disc space surrounding the disc space device and also in the perispinal soft tissues anteriorly.   2. Persistent osteophytes cause narrowing of the central canal at the fused levels.  Within the limits of this study there is no significant cord signal abnormality.     WEIGHT BEARING RESTRICTION       Recommendations for nursing staff:   Transfers: 1 person  Toileting location: toilet    TREATMENT SESSION:  Patient Start of Session: semi supine in bed    FUNCTIONAL TRANSFER ASSESSMENT  Sit to Stand: Edge of Bed  Edge of Bed: Contact Guard Assist  Toilet Transfer: Not Tested    BED MOBILITY  Supine to Sit : Supervision  Sit to Supine (OT): Supervision  Scooting: SBA    BALANCE ASSESSMENT  Static Sitting: Stand-by Assist  Static Standing: Minimal Assist    FUNCTIONAL ADL ASSESSMENT  Grooming Standing: Not Tested  LB Dressing Seated: Minimal Assist (socks SBA; shoes min A)  LB Dressing Standing: Not Tested  Toileting Seated: Not Tested  Toileting Standing: Not Tested    ACTIVITY TOLERANCE: fair-WFL                         O2 SATURATIONS       EDUCATION PROVIDED  Patient Education : Role of Occupational Therapy; Plan of Care; Discharge Recommendations; Functional Transfer Techniques; Fall Prevention; Surgical Precautions; Posture/Positioning; Energy Conservation; Proper Body Mechanics  Patient's Response to Education: Verbalized Understanding  Family/Caregiver Education : Plan of Care; Role of Occupational Therapy  Family/Caregiver's Response to Education: Verbalized Understanding    Equipment used: RW  Demonstrates functional use, Would benefit from additional trial      Therapist comments: Pt received semi supine in bed, pleasant and cooperative for OT session. Pt performs bed mobility at supervision to sit EOB with HOB elevated. While seated EOB, pt completes footwear management at SBA for  socks and min A for shoes with figure 4 sitting. Sit to stand transfer performed at CGA and pt engages in functional mobility with RW requiring CGA. Cues provided for posture/postioning (pt with heavy B UE reliance on RW). Pt with tendency to hit R foot to R back leg of RW. Pt returns to room and transfers back into bed at supervision. Good carryover of log rolling back into bed.     Pt's  present at end of session and continued long discussion of projected rehab potential. Spouse with questions on difference of acute rehab and day rehab (overnight hospital stay with 3 hours of therapy (spread out) 5-6 days/week vs 3 hours of straight therapy 3 days/week). Spouse verbalizing good understanding. After long discussion, pt agreeable to acute rehab. SW made aware.    Patient End of Session: In bed, Needs met, Call light within reach, RN aware of session/findings, All patient questions and concerns addressed, Hospital anti-slip socks, Alarm set, Family present    SUBJECTIVE  Pt pleasant and cooperative.    PAIN ASSESSMENT  Ratin  Location: denies  Management Techniques: Activity promotion, Body mechanics, Breathing techniques     OBJECTIVE  Precautions: Spine    AM-PAC ‘6-Clicks’ Inpatient Daily Activity Short Form  -   Putting on and taking off regular lower body clothing?: A Little  -   Bathing (including washing, rinsing, drying)?: A Little  -   Toileting, which includes using toilet, bedpan or urinal? : A Little  -   Putting on and taking off regular upper body clothing?: A Little  -   Taking care of personal grooming such as brushing teeth?: A Little  -   Eating meals?: A Little    AM-PAC Score:  Score: 18  Approx Degree of Impairment: 46.65%  Standardized Score (AM-PAC Scale): 38.66    PLAN  OT Device Recommendations: TBD  OT Treatment Plan: Balance activities, ADL training, Functional transfer training, UE strengthening/ROM, Patient/Family education, Patient/Family training, Equipment eval/education,  Neuromuscluar reeducation, Fine motor coordination activities, Compensatory technique education  Rehab Potential : Good  Frequency: 5x/week    OT Goals:     All goals ongoing 03/17    ADL Goals   Patient will perform eating: with set up  Patient will perform grooming: with setup  Patient will perform upper body dressing:  with setup  Patient will perform lower body dressing:  with supervision  Patient will perform toileting: with supervision     Functional Transfer Goals  Patient will transfer from supine to sit:  with stand by assist- goal met 03/17  Patient will transfer from sit to stand:  with stand by assist- goal met 03/17  Patient will transfer to toilet:  with supervision     UE Exercise Program Goal  Patient will be supervision with bilateral coordination HEP (home exercise program).     Additional Goals  Pt will tolerate standing for functional task x5 mins with supervision     Therapist Goals  Complete PhQ9    OT Session Time: 45 minutes  Self Care Home Management: 45 minutes

## 2025-03-17 NOTE — PLAN OF CARE
Alert and oriented x 4. VSS; on room air. Pain treated with PRN medication. Dressing clean, dry, and intact. Voiding without difficulty. Tolerating diet. Up min assist with a walker and gait belt. Plan for pain control. PT following. POC discussed with patient. Safety precautions in place. Call light within reach.

## 2025-03-17 NOTE — PROGRESS NOTES
[facility] Hospitalist Progress Note       SUBJECTIVE:  Feels okay   Had pain overnight,  During the day pain improved     OBJECTIVE:  Scheduled Meds:    famotidine  20 mg Oral Daily     Continuous Infusions:   PRN Meds:   diazePAM    oxyCODONE-acetaminophen **OR** oxyCODONE-acetaminophen    HYDROmorphone **OR** HYDROmorphone **OR** HYDROmorphone    acetaminophen **OR** [DISCONTINUED] HYDROcodone-acetaminophen **OR** [DISCONTINUED] HYDROcodone-acetaminophen    Vitals  @3VITALS@     Exam   Gen-    no acute distress, alert and oriented x 3   RESP-   Lungs CTA, normal respiratory effort  CV-      Heart RRR, no mgr  Abd-    soft, nondistended, nontender, bowel sounds present  Skin-    no rash  Neuro-  no focal neurologic deficits  Ext-      No edema in extremities   Psych- alert and oriented x 3     Labs:     Recent Labs   Lab 03/12/25  1150 03/13/25  0438 03/16/25  1118   WBC 9.7 14.4* 9.5   HGB 12.1 11.8* 12.8   MCV 88.5 87.7 88.8   .0 341.0 389.0       Recent Labs   Lab 03/12/25  1150 03/13/25  0438 03/14/25  0445 03/16/25  1118    142  --  142   K 3.6 4.8  --  3.6    108  --  104   CO2 20.0* 26.0  --  30.0   BUN 8* 8*  --  8*   CREATSERUM 0.69 0.67  --  0.70   CA 8.8 9.2  --  9.8   MG  --  1.8 1.9  --    * 135*  --  109*       Recent Labs   Lab 03/12/25  1150   ALT 15   AST 28   ALB 4.4       No results for input(s): \"PGLU\" in the last 168 hours.    AP:  51 y/o F w/ no major PMHx who presents after outpatient cervical spine surgery where she underwent a C4-C5, C5-C6 Anterior Cervical Discectomy and cervical disk replacement. Presenting for upper/lower extremity weakness/ataxia     Ataxia/Parasthesias  Hx of Cervical Spine surgery 3/12/25  CT/MRIs noted, no major abnormalities  Plan:  - Spine surgery on consult, recommending IV steroids, Decadron 10mg q8h scheduled - completed   - Pepcid started while on steroids  - PRN pain control  - PT/OT evaluations     DVT Ppx: no DVT until POD5 as per Spine -  around 3/17             Jacqueline Bertrand MD

## 2025-03-17 NOTE — PAYOR COMM NOTE
STARTED OUT OBS ON 3/12  MADE INPT ON 3/13  3/13 THRU 3/16  ADMISSION REVIEW     Payor: [employer] OUT OF STATE PPO  Subscriber #:  [id number]  Authorization Number: [id number]    Admit date: 3/13/25  Admit time:  4:57 PM       REVIEW DOCUMENTATION:     ED Provider Notes        ED Provider Notes signed by Sacha Manley MD at 3/12/2025  4:14 PM       Author: Sacha Manley MD Service: -- Author Type: Physician    Filed: 3/12/2025  4:14 PM Date of Service: 3/12/2025  4:11 PM Status: Signed    : Sacha Manley MD (Physician)           Patient Seen in: [facility] Emergency Department      History     Chief Complaint   Patient presents with    Numbness Weakness     Stated Complaint: numbness and tingling during procedure    Subjective:   HPI      Patient is a pleasant 50-year-old woman who presents after outpatient spine surgery with a C4-C5 C5-C6 cervical disc replacement.  After waking from surgery, she had tingling and weakness in arms and legs.  Difficulty standing.  I discussed the case with patient's spine surgeon who sent her to the ER for imaging.  Patient complains of postoperative pain.  She thinks that her weakness is improving though does still has tingling sensations.  There is thirsty.  No chest or abdominal pain.   is at bedside.  No other specific complaints.    Objective:     Past Medical History:    Hospitalism    none              History reviewed. No pertinent surgical history.             Social History     Socioeconomic History    Marital status:     Number of children: 2   Occupational History    Occupation: Sales   Tobacco Use    Smoking status: Never    Smokeless tobacco: Never   Vaping Use    Vaping status: Never Used   Substance and Sexual Activity    Alcohol use: Yes     Alcohol/week: 0.0 standard drinks of alcohol     Comment: socially    Drug use: No    Sexual activity: Yes     Partners: Male                  Physical Exam     ED Triage Vitals   BP 03/12/25 1145  (!) 109/95   Pulse 03/12/25 1145 90   Resp 03/12/25 1145 13   Temp 03/12/25 1228 97.8 °F (36.6 °C)   Temp src 03/12/25 1228 Oral   SpO2 03/12/25 1145 100 %   O2 Device 03/12/25 1145 None (Room air)       Current Vitals:   Vital Signs  BP: 125/90  Pulse: 99  Resp: (!) 28  Temp: 97.8 °F (36.6 °C)  Temp src: Oral  MAP (mmHg): 100    Oxygen Therapy  SpO2: 100 %  O2 Device: None (Room air)        Physical Exam  General: Well-appearing patient of stated age resting comfortably  HEENT: Normocephalic atraumatic.  Nonicteric sclera.  Moist mucous membranes.  Anterior cervical scar without swelling.  Lungs: No tachypnea  Cardiac: No tachycardia  Skin: No rashes, pallor  Neuro: No focal deficits.  Moves all extremities.  Mild diffuse decreased strength.  Paresthesias.  Extremities: No abnormal swelling or cyanosis.    ED Course     Labs Reviewed   COMP METABOLIC PANEL (14) - Abnormal; Notable for the following components:       Result Value    Glucose 143 (*)     CO2 20.0 (*)     BUN 8 (*)     Calculated Osmolality 297 (*)     Bilirubin, Total 0.2 (*)     All other components within normal limits   CBC WITH DIFFERENTIAL WITH PLATELET - Abnormal; Notable for the following components:    Neutrophil Absolute Prelim 8.50 (*)     Neutrophil Absolute 8.50 (*)     Lymphocyte Absolute 0.91 (*)     All other components within normal limits   SCAN SLIDE   RAINBOW DRAW LAVENDER   RAINBOW DRAW LIGHT GREEN   RAINBOW DRAW BLUE            CT cervical spine: Postsurgical changes of microdiscectomy with disc prosthesis at C4-C5 C5-C6.  No acute cervical spine fractures.  No significant osseous encroachment of the central canal.  Disc osteophyte complexes at C3-C4 C4-C5 C5-C6 C6-C7.  MRI cervical spine: Postsurgical changes.  Report reviewed and discussed with spine surgery.    MDM      Patient presents with weakness after cervical spine surgery.  Appears to be improving.  By report difficulty walking and standing.  Case discussed with   Jaylen of spine and imaging discussed with Dr. York spine as well.  At this point will admit for further care and treatment.  Discussed with duly hospitalist.  Discussed with spine.    Admission disposition: 3/12/2025  4:10 PM           Medical Decision Making      Disposition and Plan     Clinical Impression:  1. Hx of cervical spine surgery    2. Weakness         Disposition:  Admit  3/12/2025  4:10 pm     Hospital Problems       Present on Admission  Date Reviewed: 3/9/2022            ICD-10-CM Noted POA    Weakness R53.1 3/12/2025 Unknown         Signed by Sacha Manley MD on 3/12/2025  4:14 PM         MEDICATIONS ADMINISTERED IN LAST 1 DAY:  diazePAM (Valium) tab 10 mg       Date Action Dose Route User    3/17/2025 0433 Given 10 mg Oral Magnus Card RN    3/16/2025 2114 Given 10 mg Oral Magnus Card RN          famotidine (Pepcid) tab 20 mg       Date Action Dose Route User    3/17/2025 0737 Given 20 mg Oral Ariana Dawson RN          HYDROmorphone (Dilaudid) 1 MG/ML injection 0.4 mg       Date Action Dose Route User    3/17/2025 1107 Given 0.4 mg Intravenous Ariana Dawson RN    3/17/2025 0737 Given 0.4 mg Intravenous Ariana Dawson RN    3/16/2025 2251 Given 0.4 mg Intravenous Magnus Card RN    3/16/2025 1819 Given 0.4 mg Intravenous Ariana Dawson RN          oxyCODONE-acetaminophen (Percocet) 5-325 MG per tab 2 tablet       Date Action Dose Route User    3/17/2025 1252 Given 2 tablet Oral Ariana Dawson RN    3/17/2025 0839 Given 2 tablet Oral Ariana Dawson RN    3/17/2025 0301 Given 2 tablet Oral Magnus Card RN    3/16/2025 1637 Given 2 tablet Oral Ariana Dawson RN            Vitals (last day)       Date/Time Temp Pulse Resp BP SpO2 Weight O2 Device O2 Flow Rate (L/min) [facility]    03/17/25 0855 -- 106 -- -- 96 % -- -- -- SK    03/17/25 0727 98.4 °F (36.9 °C) 86 16 141/81 99 % -- None (Room air) -- SK    03/17/25 0300 97.4 °F (36.3 °C) 89 20 120/94 92 % -- None (Room air) -- RR    03/16/25  1915 97.9 °F (36.6 °C) 70 20 128/84 97 % -- None (Room air) -- RR    03/16/25 1910 98.4 °F (36.9 °C) 70 20 128/84 97 % -- None (Room air) -- RR    03/16/25 1515 97.3 °F (36.3 °C) 75 20 120/89 99 % -- None (Room air) -- NS    03/16/25 0800 97.6 °F (36.4 °C) 75 20 126/94 98 % -- None (Room air) -- NS    03/16/25 0636 -- 85 -- -- 95 % -- -- -- AF    03/16/25 0500 97.9 °F (36.6 °C) 60 18 108/96 98 % -- None (Room air) -- AF     03/15/25 1915 97.8 °F (36.6 °C) 67 20 135/88 98 % -- None (Room air) -- RR   03/15/25 0845 97.5 °F (36.4 °C) 69 18 135/88 96 % -- None (Room air) -- NS   03/15/25 0500 98.4 °F (36.9 °C) 50 18 118/88 95 % -- None (Room air) 0 L/min AF   03/14/25 2000 98.3 °F (36.8 °C) 62 18 114/74 95 % -- None (Room air) 0 L/min AF   03/14/25 1630 98.1 °F (36.7 °C) 82 18 117/87 97 % -- None (Room air) 0 L/min MM   03/14/25 1231 -- 71 -- -- 99 % -- -- -- ES   03/14/25 1230 98.3 °F (36.8 °C) 71 18 123/91 Abnormal  99 % -- None (Room air) 0 L/min MM   03/14/25 0830 98.1 °F (36.7 °C) 53 18 131/96 Abnormal  100 % -- None (Room air) 0 L/min MM   03/14/25 0721 -- -- -- -- -- 114 lb (51.7 kg) -- -- AM   03/14/25 0500 -- 51 -- -- 100 % -- -- -- MH   03/14/25 0400 97.4 °F (36.3 °C) 50 18 132/78 96 % -- None (Room air) 0 L/min MH   03/14/25 0000 98.4 °F (36.9 °C) 60 18 114/88 95 % -- None (Room air) 0 L/min MH   03/13/25 2000 98.1 °F (36.7 °C) 63 18 121/87 -- -- None (Room air) 0 L/min MH   03/13/25 1730 97.6 °F (36.4 °C) 67 18 119/85 99 % -- None (Room air) 0 L/min MM   03/13/25 1145 98.1 °F (36.7 °C) 85 18 -- 99 % -- None (Room air) 0 L/min MM   03/13/25 1145 -- -- -- 100/51 -- -- -- -- KW   03/13/25 0730 98.2 °F (36.8 °C) 64 18 122/79 98 % -- None (Room air) 0 L/min MM   03/13/25 0400 97.4 °F (36.3 °C) 65 18 115/70 97 % -- None (Room air) 0 L/min MH   03/13/25 0000 97.6 °F (36.4 °C) 67 18 113/82 95 % -- None (Room air) 0 L/min MH     3/12 H&P  [facility] Hospitalist History and Physical       PCP: Hu Orozco,  DO     History of Present Illness: This is the story of Ms. Brigitte Ac who is a 51 y/o F w/ no major PMHx who presents after outpatient cervical spine surgery where she underwent a C4-C5, C5-C6 Anterior Cervical Discectomy and cervical disk replacement. Post operatively it appears the patient had notable weakness in the upper and lower extremities and was having difficulties bearing weight and walking/transferring on her own. ER evaluation was recommended. Patient admits to some parathesias in her fingers/toes that are still persisting but otherwise no other major complaints. Denies dizziness/lightheadedness, dysuria, n/v/d.  ED Vitals: wnl  Labs:  Stable     CT Cervical Spine: showed post surgical changes of microdiscectomy w/ disc prostheses at C4/C5 and C5/C6 levels, no subluxaion, no acute fracture  MRI Cervical Spine: post operative changes and osteophytes but otherwise no major issues.     Spine surgery was consulted, patient was given multiple doses of pain medications, Ativan, Zofran, and 1L NS bolus and admitted for observation overnight.      Assessment/Plan:   Outpatient records or previous hospital records reviewed.   [facility] Hospitalist to continue to follow patient while in house     A/P: 51 y/o F w/ no major PMHx who presents after outpatient cervical spine surgery where she underwent a C4-C5, C5-C6 Anterior Cervical Discectomy and cervical disk replacement. Presenting for upper/lower extremity weakness/ataxia     Ataxia/Parasthesias  Hx of Cervical Spine surgery today  POD0  CT/MRIs noted, no major abnormalities  Plan:  - Spine surgery on consult  - PRN pain control  - PT/OT evaluations     3/13 ORTHO CONSULT NOTE  S:  Patient presents after having symptoms in all four extremities after her outpatient cervical disc replacement yesterday. She notes numbness, parathesias, and weakness in all four extremities. She states that the pain is minimal.      Inspection: Awake Alert  No acute distress.       Blood pressure 122/79, pulse 64, temperature 97.4 °F (36.3 °C), temperature source Oral, resp. rate 18, height 5' 4\" (1.626 m), weight 118 lb (53.5 kg), last menstrual period 01/22/2025, SpO2 98%          Recent Labs   Lab 03/13/25  0438   RBC 3.91   HGB 11.8*   HCT 34.3*   MCV 87.7   MCH 30.2   MCHC 34.4   RDW 11.9   NEPRELIM 11.77*   WBC 14.4*   .0         Incision:  Dressing in place with minimal drainage noted     Neck supple        Strength testing:                Left                                    Right                5/5 Deltoid                        5/5 Deltoid                          5/5 Biceps                        5/5 Biceps                5/5 Triceps                       5/5 Triceps                3/5                               3/5                 2/5 Intrinsics                    2/5 Intrinsics     Lower extremities:                 5/5 bilateral plantar flexion                3/5 bilateral dorsiflexion                4/5 bilateral EHL                5/5 bilateral quad strength                5/5 bilateral hip flexors                     Sensation: Normal sensation noted to light touch and pressure throughout bilateral upper extremities.     Calves supple NT bilaterally     A/P: POD # 1 s/p C4-C6 CDR     P: Patient is doing well overall. New onset radicular symptoms and weakness since surgery. Continue to monitor. Okay to work with PT/OT.     3/14 HOSPITALIST NOTE          SUBJECTIVE:   Pt feels oky has some numbness in her hands  Able to walk with walker today     OBJECTIVE:  Scheduled Meds:   Scheduled Medications    famotidine  20 mg Oral Daily           Vitals      Vitals:     03/14/25 0830   BP: (!) 131/96   Pulse: 53   Resp: 18   Temp: 98.1 °F (36.7 °C)            Exam   Gen-    no acute distress, alert and oriented x 3   RESP-   Lungs CTA, normal respiratory effort  CV-      Heart RRR, no mgr  Abd-    soft, nondistended, nontender, bowel sounds present  Skin-    no  rash  Neuro-  no focal neurologic deficits  Ext-      No edema in extremities   Psych- alert and oriented x 3      Labs:           Recent Labs   Lab 03/12/25  1150 03/13/25  0438   WBC 9.7 14.4*   HGB 12.1 11.8*   MCV 88.5 87.7   .0 341.0               Recent Labs   Lab 03/12/25  1150 03/13/25  0438 03/14/25  0445    142  --    K 3.6 4.8  --     108  --    CO2 20.0* 26.0  --    BUN 8* 8*  --    CREATSERUM 0.69 0.67  --    CA 8.8 9.2  --    MG  --  1.8 1.9   * 135*  --       AP:  A/P: 49 y/o F w/ no major PMHx who presents after outpatient cervical spine surgery where she underwent a C4-C5, C5-C6 Anterior Cervical Discectomy and cervical disk replacement. Presenting for upper/lower extremity weakness/ataxia     Ataxia/Parasthesias  Hx of Cervical Spine surgery 3/12/25  CT/MRIs noted, no major abnormalities  Plan:  - Spine surgery on consult, recommending IV steroids, Decadron 10mg q8h scheduled - completed   - Pepcid started while on steroids  - PRN pain control  - PT/OT evaluations     DVT Ppx: no DVT until POD5 as per Spine - around 3/17     3/14 ORTHO NOTE  S:   Patient notes posterior neck and shoulder pain along with continued bilateral upper and lower extremity numbness, tingling, and weakness. She has been up and moving with PT. She notes no improvement in her symptoms. States that they have not worsened.      Inspection: Awake Alert  No acute distress.      Blood pressure (!) 131/96, pulse 53, temperature 98.1 °F (36.7 °C), temperature source Oral, resp. rate 18, height 5' 4\" (1.626 m), weight 114 lb (51.7 kg), last menstrual period 01/22/2025, SpO2 100%,         Incision:  Dressing in place, Wound/s well approximated, no erythema or drainage      Neck supple        Strength testing:                Left                                    Right                5/5 Deltoid                        5/5 Deltoid                          5/5 Biceps                        5/5 Biceps                 5/5 Ext Rot                       5/5 Ext Rot                5/5 Triceps                       5/5 Triceps                3/5                              3/5                 3/5 Intrinsics                    3/5 Intrinsics     Lower extremities: 5/5 bilateral hip flexors, quads, and plantar flexion  4/5 bilateral EHL  4-/5 bilateral DF                     Sensation: Normal sensation noted to light touch and pressure throughout bilateral upper extremities.     Calves supple NT bilaterally     A/P: POD # 2 s/p C4-C6 CDR     P: Patient is doing well overall. Continued upper and lower extremity radicular symptoms. Slight improvement in foot weakness compared to yesterday. Continue to monitor. Continue with PT/OT. Continue to get up and ambulate at least three times per day.      3/15 HOSPITALIST NOTE    SUBJECTIVE:  Having some post op pain  No nausea no vomiting  Some numbness her hands still      Vitals      Vitals:     03/15/25 0500   BP: 118/88   Pulse: 50   Resp: 18   Temp: 98.4 °F (36.9 °C)            Exam   Gen-    no acute distress, alert and oriented x 3   RESP-   Lungs CT, normal respiratory effort  CV-      Heart RRR, no mgr  Abd-    soft, nondistended, nontender, bowel sounds present  Skin-    no rash  Neuro-  no focal neurologic deficits  Ext-      No edema in extremities   Psych- alert and oriented x 3      AP:     49 y/o F w/ no major PMHx who presents after outpatient cervical spine surgery where she underwent a C4-C5, C5-C6 Anterior Cervical Discectomy and cervical disk replacement. Presenting for upper/lower extremity weakness/ataxia     Ataxia/Parasthesias  Hx of Cervical Spine surgery 3/12/25  CT/MRIs noted, no major abnormalities  Plan:  - Spine surgery on consult, recommending IV steroids, Decadron 10mg q8h scheduled - completed   - Pepcid started while on steroids  - PRN pain control  - PT/OT evaluations     DVT Ppx: no DVT until POD5 as per Spine - around 3/17     3/15 ORTHO NOTE  S:    Patient notes posterior neck and shoulder pain along with continued bilateral upper and lower extremity numbness, tingling, and weakness. She has been up and moving with PT.   Update 3/15/2025: patients states feeling the same.      Inspection: Awake Alert  No acute distress.      Blood pressure 135/88, pulse 69, temperature 97.5 °F (36.4 °C), temperature source Oral, resp. rate 18, height 5' 4\" (1.626 m), weight 114 lb (51.7 kg), last menstrual period 01/22/2025, SpO2 96%,         Incision:  Dressing in place, Wound/s well approximated, no erythema or drainage      Neck supple        Strength testing:                Left                                    Right                5/5 Deltoid                        5/5 Deltoid                          5/5 Biceps                        5/5 Biceps                5/5 Ext Rot                       5/5 Ext Rot                5/5 Triceps                       5/5 Triceps                3/5                              3/5                 3/5 Intrinsics                    3/5 Intrinsics     Lower extremities: 5/5 bilateral hip flexors, quads, and plantar flexion  4/5 bilateral EHL  4-/5 bilateral DF                     Sensation: Normal sensation noted to light touch and pressure throughout bilateral upper extremities.     Calves supple NT bilaterally     A/P: POD # 3  s/p C4-C6 CDR     P: Patient is doing well overall. Continued upper and lower extremity radicular symptoms. Continue to monitor. Continue with PT/OT. Continue to get up and ambulate at least three times per day.     3/16 HOSPITALIST NOTE    SUBJECTIVE:  Pt still has tingling in her hands   Trying to decide on rehab vs home        Exam   Gen-    no acute distress, alert and oriented x 3   RESP-   Lungs CTA, normal respiratory effort  CV-      Heart RRR, no mgr  Abd-    soft, nondistended, nontender, bowel sounds present  Skin-    no rash  Neuro-  no focal neurologic deficits  Ext-      No edema in  extremities   Psych- alert and oriented x 3       AP:  49 y/o F w/ no major PMHx who presents after outpatient cervical spine surgery where she underwent a C4-C5, C5-C6 Anterior Cervical Discectomy and cervical disk replacement. Presenting for upper/lower extremity weakness/ataxia     Ataxia/Parasthesias  Hx of Cervical Spine surgery 3/12/25  CT/MRIs noted, no major abnormalities  Plan:  - Spine surgery on consult, recommending IV steroids, Decadron 10mg q8h scheduled - completed   - Pepcid started while on steroids  - PRN pain control  - PT/OT evaluations     DVT Ppx: no DVT until POD5 as per Spine - around 3/17     3/16 ORTHO NOTE  S:   Patient notes posterior neck and shoulder pain along with continued bilateral upper and lower extremity numbness, tingling, and weakness. She has been up and moving with PT.   Update 3/15/2025: patients states feeling the same.   Update 3/16/2025: patients states feeling the same.      Inspection: Awake Alert  No acute distress.      Blood pressure (!) 126/94, pulse 75, temperature 97.6 °F (36.4 °C), temperature source Oral, resp. rate 20, height 5' 4\" (1.626 m), weight 114 lb (51.7 kg), last menstrual period 01/22/2025, SpO2 98%         Incision:  Dressing in place, Wound/s well approximated, no erythema or drainage      Neck supple        Strength testing:                Left                                    Right                5/5 Deltoid                        5/5 Deltoid                          5/5 Biceps                        5/5 Biceps                5/5 Ext Rot                       5/5 Ext Rot                5/5 Triceps                       5/5 Triceps                3/5                              3/5                 3/5 Intrinsics                    3/5 Intrinsics     Lower extremities: 5/5 bilateral hip flexors, quads, and plantar flexion  4/5 bilateral EHL  4-/5 bilateral DF                     Sensation: Normal sensation noted to light touch and  pressure throughout bilateral upper extremities.     Calves supple NT bilaterally     A/P: POD # 5  s/p C4-C6 CDR     P: Patient is doing well overall. Continued upper and lower extremity radicular symptoms. Continue to monitor. Continue with PT/OT. Continue to get up and ambulate at least three times per day.      Sally Parkinson PA-C

## 2025-03-17 NOTE — PLAN OF CARE
Pt A&Ox4. VSS on RA. . 4x4 and Tegaderm dressing intact and clean. Left shoulder pain managed with prn percocet, heat compress applied to left shoulder. Numbness and tingling remains present to BUE and BLE. Up min assist with walker and GB. Pt declining rehab at this time. PT/OT. Call light within reach. Will continue to monitor.

## 2025-03-17 NOTE — PROGRESS NOTES
[facility]   part of [facility]    Progress Note    Brigitte Ac Patient Status:  Inpatient    1975 MRN [id number]   Location [facility] 3SW-A Attending Jacqueline Bertrand MD   Hosp Day # 4 PCP Hu Orozco DO     S:   Patient notes continued posterior neck pain. She has continued bilateral upper and lower extremity numbness and tingling, unchanged since surgery. She states that she is walking further and is pleased with this. She is requesting more muscle relaxant.     Inspection: Awake Alert  No acute distress.     Blood pressure 141/81, pulse 106, temperature 98.4 °F (36.9 °C), temperature source Oral, resp. rate 16, height 5' 4\" (1.626 m), weight 114 lb (51.7 kg), last menstrual period 2025, SpO2 96%, not currently breastfeeding.    Recent Labs   Lab 25  1118   RBC 4.19   HGB 12.8   HCT 37.2   MCV 88.8   MCH 30.5   MCHC 34.4   RDW 11.9   NEPRELIM 4.90   WBC 9.5   .0       Incision:  Dressing in place and dry     Neck supple      Strength testing:   Left   Right   5/5 Deltoid  5/5 Deltoid    5/5 Biceps  5/5 Biceps   5/5 Ext Rot  5/5 Ext Rot   5/5 Wrist Ext  5/5 Wrist Ext   5/5 Triceps  5/5 Triceps   3/5    3/5    3/5 Intrinsics  3/5 Intrinsics    Lower extremities:   5/5 bilateral hip flexors  5/5 bilateral quad  5/5 bilateral plantar flexion  4/5 bilateral dorsiflexion  4/5 bilateral EHL      Calves supple NT bilaterally    A/P: POD # 5 s/p C4-C6 CDR    P: Patient is doing well and will continue to work with PT/OT. Continue to monitor. She can shower with her tegaderm on. Can remove dressing tomorrow and leave uncovered as long as no drainage. No submerging incision. No lotions or ointments on incision.     Bree Blunt PA-C   25

## 2025-03-18 PROCEDURE — 97535 SELF CARE MNGMENT TRAINING: CPT

## 2025-03-18 RX ORDER — OXYCODONE HYDROCHLORIDE 10 MG/1
10 TABLET ORAL EVERY 6 HOURS PRN
Status: DISCONTINUED | OUTPATIENT
Start: 2025-03-18 | End: 2025-03-19

## 2025-03-18 RX ORDER — FLUTICASONE PROPIONATE 50 MCG
1 SPRAY, SUSPENSION (ML) NASAL DAILY
Status: DISCONTINUED | OUTPATIENT
Start: 2025-03-18 | End: 2025-03-19

## 2025-03-18 RX ORDER — METHOCARBAMOL 500 MG/1
500 TABLET, FILM COATED ORAL ONCE
Status: COMPLETED | OUTPATIENT
Start: 2025-03-18 | End: 2025-03-18

## 2025-03-18 RX ORDER — OXYCODONE HYDROCHLORIDE 5 MG/1
5 TABLET ORAL EVERY 6 HOURS PRN
Status: DISCONTINUED | OUTPATIENT
Start: 2025-03-18 | End: 2025-03-19

## 2025-03-18 NOTE — PROGRESS NOTES
[facility]   part of [facility]    Progress Note    Brigitte Ac Patient Status:  Inpatient    1975 MRN [id number]   Location [facility] 3SW-A Attending Jacqueline Bertrand MD   Hosp Day # 5 PCP Hu Orozco DO     S:   Patient notes continued posterior neck pain.     Inspection: Awake Alert  No acute distress.     Blood pressure (!) 120/92, pulse 86, temperature 98.2 °F (36.8 °C), temperature source Oral, resp. rate 16, height 5' 4\" (1.626 m), weight 114 lb (51.7 kg), last menstrual period 2025, SpO2 94%, not currently breastfeeding.    Recent Labs   Lab 25  1118   RBC 4.19   HGB 12.8   HCT 37.2   MCV 88.8   MCH 30.5   MCHC 34.4   RDW 11.9   NEPRELIM 4.90   WBC 9.5   .0       Incision:  Dressing in place and dry     Neck supple      Strength testing:   Left   Right   5/5 Deltoid  5/5 Deltoid    5/5 Biceps  5/5 Biceps   5/5 Ext Rot  5/5 Ext Rot   5/5 Wrist Ext  5/5 Wrist Ext   5/5 Triceps  5/5 Triceps   3/5    3/5    3/5 Intrinsics  3/5 Intrinsics    Lower extremities:   5/5 bilateral hip flexors  5/5 bilateral quad  5/5 bilateral plantar flexion  4/5 bilateral dorsiflexion  4/5 bilateral EHL      Calves supple NT bilaterally    A/P: POD # 5 s/p C4-C6 CDR    P: Patient is doing well and will continue to work with PT/OT. Continue to monitor. She can shower with her tegaderm on. Can remove dressing tomorrow and leave uncovered as long as no drainage. No submerging incision. No lotions or ointments on incision.     Sally Parkinson PA-C  3/18/2025

## 2025-03-18 NOTE — PLAN OF CARE
Alert and oriented x 4. VSS; on room air. Dressing clean, dry, and intact. Voiding without difficulty. Tolerating diet. Up min assist with a walker and gait belt. PT rec Acute rehab. POC discussed with patient. Safety precautions in place. Call light within reach.       Patient communicated to me that she would like x1 dose of dilaudid as she was unaware that dilaudid was discontinued. This RN educated patient on importance of weaning off IV prn pain medication. I notified Bree Blunt @2033 what patient would like, replied that surgical team discontinued IV dilaudid and Dr. York is aware and in agreement for med to no longer be administered to patient. Celebrex was added (see mar). This RN explained this information to patient and patient understands. Pain is being treated with PRN Valium, PRN Percocet, and Scheduled Celebrex.

## 2025-03-18 NOTE — PHYSICAL THERAPY NOTE
PHYSICAL THERAPY TREATMENT NOTE - INPATIENT    Room Number: 376/376-A     Session: 3    Number of Visits to Meet Established Goals: 5    Presenting Problem: UE/LE weakness  Co-Morbidities : s/p C4-C6 CDR as an OP 3/12/2025    HOME SITUATION  Type of Home: House  Home Layout: Two level, Able to live on main level  Stairs to Enter : 2      Lives With: Spouse, Family    Drives: Yes   Patient Regularly Uses: None       Prior Level of [place]: Pt lives with spouse and 2 children (13 and 18 y.o) in 2 story home. Primary bed/bath is on the main level of the home. Pt active and independent at baseline.        PHYSICAL THERAPY ASSESSMENT   Patient demonstrates good  progress this session, goals  updated to reflect patient performance.      Patient is requiring minimal assist as a result of the following impairments: decreased functional strength, decreased endurance/aerobic capacity, pain, impaired dynamic balance, impaired coordination, impaired motor planning, and decreased muscular endurance.     Patient continues to function below baseline with bed mobility, transfers, gait, stair negotiation, standing prolonged periods, and performing household tasks.  Next session anticipate patient to progress transfers, gait, stair negotiation, and standing prolonged periods.  Physical Therapy will continue to follow patient for duration of hospitalization.    Patient continues to benefit from continued skilled PT services: to facilitate return to prior level of function as patient demonstrates high motivation with excellent tolerance to an intensive therapy program .    PLAN DURING HOSPITALIZATION  Nursing Mobility Recommendation : 1 Assist  PT Device Recommendation: Rolling walker  PT Treatment Plan: Bed mobility, Endurance, Energy conservation, Patient education, Gait training, Strengthening, Neuromuscular re-educate, Balance training, Transfer training, Stair training  Frequency (Obs): Daily     CURRENT GOALS     Goal #1  Patient is able to demonstrate supine - sit EOB @ level: minimum assistance   MET updated to SUPERVISION   Goal #2 Patient is able to demonstrate transfers EOB to/from BSC at assistance level: supervision   MET   Goal #3 Patient is able to ambulate 100 feet with assist device: walker - rolling at assistance level: minimum assistance   MET UPDATED 150 feet   Goal #4 Pt will demonstrate good understanding of spine precautions    Goal #5     Goal #6     Goal Comments: Goals established on 3/13/2025  3/18/2025 all goals ongoing      SUBJECTIVE  \" I have this pain in my right arm\"     OBJECTIVE  Precautions: Spine      PAIN ASSESSMENT   Rating: Unable to rate  Location: RUE, shoulder to forearm  Management Techniques: Body mechanics, Breathing techniques, Relaxation, Repositioning    BALANCE                                                                                                                       Static Sitting: Fair -  Dynamic Sitting: Fair -           Static Standing: Poor +  Dynamic Standing: Poor +    AM-PAC '6-Clicks' INPATIENT SHORT FORM - BASIC MOBILITY  How much difficulty does the patient currently have...  Patient Difficulty: Turning over in bed (including adjusting bedclothes, sheets and blankets)?: None   Patient Difficulty: Sitting down on and standing up from a chair with arms (e.g., wheelchair, bedside commode, etc.): A Little   Patient Difficulty: Moving from lying on back to sitting on the side of the bed?: A Little   How much help from another person does the patient currently need...   Help from Another: Moving to and from a bed to a chair (including a wheelchair)?: A Little   Help from Another: Need to walk in hospital room?: A Little   Help from Another: Climbing 3-5 steps with a railing?: A Lot     AM-PAC Score:  Raw Score: 18   Approx Degree of Impairment: 46.58%   Standardized Score (AM-PAC Scale): 43.63   CMS Modifier (G-Code): CK    FUNCTIONAL ABILITY STATUS  Gait Assessment    Functional Mobility/Gait Assessment  Gait Assistance: Minimum assistance  Distance (ft): 12  Assistive Device: Rolling walker  Pattern: R Foot drag, L Steppage, R Foot flat, L Foot flat, Ataxic    Skilled Therapy Provided    Bed Mobility:  Rolling:   Supine<>Sit:    Sit<>Supine:     Transfer Mobility:  Sit<>Stand: min A   Stand<>Sit: min A   Gait: min A c RW    Therapist's Comments: pt presents with limited RUE ROM and pain increased from previous session  Pt requires min A for safe functional mobility  Max A to don shoes  Min A for standing balance during toileting.   Pt demos weak glute/quads when standing and poor R ant tib control during swing phase.           Patient End of Session: With  staff, Needs met, Call light within reach, RN aware of session/findings, All patient questions and concerns addressed, Family present    PT Session Time: 15 minutes  Gait Training: 15 minutes  Therapeutic Activity: 0 minutes  Therapeutic Exercise:  minutes   Neuromuscular Re-education:  minutes

## 2025-03-18 NOTE — CM/SW NOTE
03/18/25 1624   Choice of Post-Acute Provider   Informed patient of right to choose their preferred provider Yes   List of appropriate post-acute services provided to patient/family with quality data No - Declined list   Patient/family choice [facility]   Information given to Patient       Met with pt at bedside to discuss DC planning.  PMR recommending acute rehab.  SRAL indicating pt more appropriate for outpatient day rehab.  Reviewed options with pt.  All questions/concerns addressed.  Pt stated preference for acute rehab at .  Discussed need for insurance auth.     reserved in [facility].  Updated Barb from .  They will request insurance auth.  Await auth for discharge.  / to remain available for support and/or discharge planning.     Mallory Starkey, [facility]  Discharge Planner  [phone]

## 2025-03-18 NOTE — PROGRESS NOTES
[facility] Hospitalist Progress Note       SUBJECTIVE:  Elana some nasal congestion   Having pain of her arms       OBJECTIVE:  Scheduled Meds:    fluticasone propionate  1 spray Each Nare Daily    celecoxib  100 mg Oral q12h    famotidine  20 mg Oral Daily     Continuous Infusions:   PRN Meds:   benzocaine-menthol    diazePAM    oxyCODONE-acetaminophen **OR** oxyCODONE-acetaminophen    acetaminophen **OR** [DISCONTINUED] HYDROcodone-acetaminophen **OR** [DISCONTINUED] HYDROcodone-acetaminophen    Vitals  Vitals:    03/18/25 1037   BP:    Pulse: 86   Resp:    Temp:          Exam   Gen-    no acute distress, alert and oriented x 3   RESP-   Lungs CTA, normal respiratory effort  CV-      Heart RRR, no mgr  Abd-    soft, nondistended, nontender, bowel sounds present  Skin-    no rash  Neuro-  no focal neurologic deficits  Ext-      No edema in extremities   Psych- alert and oriented x 3     Labs:     Recent Labs   Lab 03/12/25  1150 03/13/25  0438 03/16/25  1118   WBC 9.7 14.4* 9.5   HGB 12.1 11.8* 12.8   MCV 88.5 87.7 88.8   .0 341.0 389.0       Recent Labs   Lab 03/12/25  1150 03/13/25  0438 03/14/25  0445 03/16/25  1118    142  --  142   K 3.6 4.8  --  3.6    108  --  104   CO2 20.0* 26.0  --  30.0   BUN 8* 8*  --  8*   CREATSERUM 0.69 0.67  --  0.70   CA 8.8 9.2  --  9.8   MG  --  1.8 1.9  --    * 135*  --  109*       Recent Labs   Lab 03/12/25  1150   ALT 15   AST 28   ALB 4.4       No results for input(s): \"PGLU\" in the last 168 hours.    AP:  51 y/o F w/ no major PMHx who presents after outpatient cervical spine surgery where she underwent a C4-C5, C5-C6 Anterior Cervical Discectomy and cervical disk replacement. Presenting for upper/lower extremity weakness/ataxia     Ataxia/Parasthesias  Hx of Cervical Spine surgery 3/12/25  CT/MRIs noted, no major abnormalities  Plan:  - Spine surgery on consult, recommending IV steroids, Decadron 10mg q8h scheduled - completed   - Pepcid started while on  steroids  - PRN pain control - on celebrex, valium and perocet, can try robaxin times 1  - nasal congestion: flonase   - PT/OT evaluations     DVT Ppx: no DVT until POD5 as per Spine - around 3/17             Jacqueline Bertrand MD

## 2025-03-18 NOTE — CONSULTS
[facility]    Brigitte Ac Patient Status:  Inpatient    1975 MRN [id number]   Location [facility] 3SW-A Attending Jacqueline Bertrand MD   Hosp Day # 5 PCP Hu Orozco DO     Patient Identification  Brigitte Ac is a 50 year old female.  :  1975  Admit Date:  3/12/2025  Attending Provider:  Jacqueline Bertrand MD                                  Primary Care Physician:  Hu Orozco DO   Admitting Diagnosis: Weakness [R53.1]  Hx of cervical spine surgery [Z98.890]    Subjective:      Reason for Consultation: Impaired ADL and mobility dysfuction due to  C4-C6 anterior Cervical Discectomy/replacement 3/12/25  History of present illness:  Records reviewed, and patient examined.Consult Requested by:       Patient is a 49 yo Female with non-traumatic progressively worsening neck and shoulder pain that failed to respond to conservative measures. On 3/12/25 underwent  C4-C6 anterior Cervical Discectomy/replacement by .   Was discharged home the same day.   Post operatively it appears the patient had notable weakness in the upper and lower extremities and was having difficulties bearing weight and walking/transferring on her own. ER evaluation was recommended. Patient admits to some parathesias in her fingers/toes that are still persisting but otherwise no other major complaints. Denies dizziness/lightheadedness, dysuria, n/v/d.    CT Cervical Spine: showed post surgical changes of microdiscectomy w/ disc prostheses at C4/C5 and C5/C6 levels, no subluxaion, no acute fracture  MRI Cervical Spine: post operative changes and osteophytes but otherwise no major issues.    Spine surgery on consult, recommending IV steroids, Decadron 10mg q8h scheduled - completed   - Pepcid started while on steroids    C/o BUE paresthesias.  Continent.      Physiatry consult obtained now to assess pt's funtional status and make appropriate recommendations.      HOME SITUATION  Type of Home: House  Home Layout:  Two level, Able to live on main level  Stairs to Enter : 2      Lives With: Spouse, Family    Drives: Yes   Patient Regularly Uses: None       Prior Level of [place]: Pt lives with spouse and 2 children (13 and 18 y.o) in 2 story home. Primary bed/bath is on the main level of the home. Pt active and independent at baseline.     Current Functional Status:     Bed Mobility:  Rolling:   Supine<>Sit:      Sit<>Supine:      Transfer Mobility:  Sit<>Stand: min A         Stand<>Sit: min A         Gait: min A c RW    Gait Assessment   Functional Mobility/Gait Assessment  Gait Assistance: Minimum assistance  Distance (ft): 12  Assistive Device: Rolling walker  Pattern: R Foot drag, L Steppage, R Foot flat, L Foot flat, Ataxic    Past Medical History:  @Medical hx@  Past Medical History:    Hospitalism    none       History reviewed. No pertinent surgical history.     benzocaine-menthol (Cepacol) lozenge 1 lozenge  1 lozenge Oral PRN    [COMPLETED] methocarbamol (Robaxin) tab 500 mg  500 mg Oral Once    fluticasone propionate (Flonase) 50 MCG/ACT nasal suspension 1 spray  1 spray Each Nare Daily    celecoxib (CeleBREX) cap 100 mg  100 mg Oral q12h    diazePAM (Valium) tab 10 mg  10 mg Oral Q6H PRN    [COMPLETED] magnesium oxide (Mag-Ox) tab 400 mg  400 mg Oral Once    oxyCODONE-acetaminophen (Percocet) 5-325 MG per tab 1 tablet  1 tablet Oral Q4H PRN    Or    oxyCODONE-acetaminophen (Percocet) 5-325 MG per tab 2 tablet  2 tablet Oral Q4H PRN    [COMPLETED] dexamethasone (Decadron) 4 MG/ML injection 10 mg  10 mg Intravenous Q8H    famotidine (Pepcid) tab 20 mg  20 mg Oral Daily    [COMPLETED] sodium chloride 0.9 % IV bolus 1,000 mL  1,000 mL Intravenous Once    [COMPLETED] HYDROmorphone (Dilaudid) 1 MG/ML injection 1 mg  1 mg Intravenous Once    [COMPLETED] ondansetron (Zofran) 4 MG/2ML injection 4 mg  4 mg Intravenous Once    [COMPLETED] HYDROmorphone (Dilaudid) 1 MG/ML injection 1 mg  1 mg Intravenous Once     [COMPLETED] LORazepam (Ativan) 2 mg/mL injection 1 mg  1 mg Intravenous Once    [COMPLETED] HYDROmorphone (Dilaudid) 1 MG/ML injection 1 mg  1 mg Intravenous Once    [] ondansetron (Zofran) 4 MG/2ML injection 4 mg  4 mg Intravenous Q4H PRN    acetaminophen (Tylenol) tab 650 mg  650 mg Oral Q4H PRN       Social History     Tobacco Use    Smoking status: Never    Smokeless tobacco: Never   Substance Use Topics    Alcohol use: Yes     Alcohol/week: 0.0 standard drinks of alcohol     Comment: socially       Family History   Problem Relation Age of Onset    Hypertension Father     Diabetes Mother     Heart Disorder Mother 50        CABG    Cancer Maternal Grandmother         breast       Allergies:  Allergies[1]             Review of Systems:  Complete review of systems completed/14 point.  Negative except as that outlined in HPI    OBJECTIVE:    Blood pressure (!) 120/92, pulse 86, temperature 98.2 °F (36.8 °C), temperature source Oral, resp. rate 16, height 5' 4\" (1.626 m), weight 114 lb (51.7 kg), last menstrual period 2025, SpO2 94%, not currently breastfeeding.    Intake/Output Summary (Last 24 hours) at 3/18/2025 1553  Last data filed at 3/18/2025 1510  Gross per 24 hour   Intake --   Output 3 ml   Net -3 ml       Physical Exam:                                      General: Alert, cooperative, no distress, appears stated age.  Head:  Normocephalic, without obvious abnormality, atraumatic.   Eyes:  Conjunctivae/lids clear. PERRL, EOMs intact. Vision functional.   Ears/Nose/Throat: Hearing intact. Lips, mucosa, and tongue normal. Teeth and gums normal. Moist mucous membranes.     Neck: No neck masses or thyroid enlargement/tenderness/nodules.       Lungs:   Resonant, clear breath sounds, quiet accessory muscles.   Chest wall:  No tenderness or deformity.   Cardiovascular:  Heart with regular rate rhythm, no murmurs appreciated. Radial and pedal pulses good. No cyanosis in all extremities.   Abdomen:   No  tenderness guarding or rigidity. Liver and spleen are not enlarged.  Bowel sounds present.                   Musculoskeletal:     Right Upper Extremity:  Strength is 4.  ROM WNL.   Left Upper Extremity:  Strength is 4.  ROM WNL.   Right Lower Extremity:  Strength  is 4.   ROM WNL.   Left Lower Extremity: Strength  is 4.   ROM WNL.          Neuro: CNII-XII are grossly intact. Sensation to dull touch intact in all extremities and reflexes are 2+                    Psychiatric: Awake, alert and oriented x 3.             Assessment:                                Rehab diagnosis: ADL and  mobility dysfunction due to C4-C6 anterior Cervical Discectomy/replacement 3/12/25    Disposition:     Based on pt's current functional and medical status, Acute inpatient rehabilitation is recommended to maximize patient's independence, provide care giver training, and evaluate for home equipment needs with ELOS 7-10 days.     Medical necessity includes  thromboembolic risk,  medication management, wound/incision management, orthotic clinic eval for R AFO.     Patient would benefit and is able to participate in 3 hours of therapy daily. Patient is anticipated to discharge to home when acute inpatient rehabilitation course is complete.        Thank you for the consult.     Palma Oshea MD  [facility].           [1] No Known Allergies

## 2025-03-19 ENCOUNTER — APPOINTMENT (OUTPATIENT)
Dept: GENERAL RADIOLOGY | Facility: HOSPITAL | Age: 50
End: 2025-03-19
Payer: COMMERCIAL

## 2025-03-19 VITALS
HEART RATE: 78 BPM | RESPIRATION RATE: 20 BRPM | SYSTOLIC BLOOD PRESSURE: 127 MMHG | HEIGHT: 64 IN | OXYGEN SATURATION: 95 % | BODY MASS INDEX: 19.46 KG/M2 | TEMPERATURE: 99 F | WEIGHT: 114 LBS | DIASTOLIC BLOOD PRESSURE: 88 MMHG

## 2025-03-19 PROCEDURE — 97110 THERAPEUTIC EXERCISES: CPT

## 2025-03-19 PROCEDURE — 97112 NEUROMUSCULAR REEDUCATION: CPT

## 2025-03-19 PROCEDURE — 97116 GAIT TRAINING THERAPY: CPT

## 2025-03-19 PROCEDURE — 72040 X-RAY EXAM NECK SPINE 2-3 VW: CPT

## 2025-03-19 RX ORDER — DIAZEPAM 5 MG/1
5 TABLET ORAL EVERY 6 HOURS PRN
Qty: 15 TABLET | Refills: 0 | Status: SHIPPED | OUTPATIENT
Start: 2025-03-19

## 2025-03-19 RX ORDER — OXYCODONE AND ACETAMINOPHEN 10; 325 MG/1; MG/1
1 TABLET ORAL EVERY 6 HOURS PRN
Qty: 15 TABLET | Refills: 0 | Status: SHIPPED | OUTPATIENT
Start: 2025-03-19

## 2025-03-19 RX ORDER — DIAZEPAM 5 MG/1
5 TABLET ORAL EVERY 6 HOURS PRN
Qty: 1 TABLET | Refills: 0 | Status: SHIPPED | OUTPATIENT
Start: 2025-03-19

## 2025-03-19 NOTE — PLAN OF CARE
Alert and oriented x 4. VSS; on room air. Pain being treated with PRN meds (see mar). Voiding without difficulty. Tolerating diet. Up min assist with a walker and gait belt. PMR rec Acute rehab. POC discussed with patient. Safety precautions in place. Call light within reach.

## 2025-03-19 NOTE — CM/SW NOTE
[facility]ittney started insurance auth today.    Migdalia Paul, [facility]  /[facility]charge Planner

## 2025-03-19 NOTE — PROGRESS NOTES
Patient discharged to [facility]. Vitals stable, pain controlled Dressing dry and intact. Discharge report given to receiving facility.

## 2025-03-19 NOTE — PHYSICAL THERAPY NOTE
PHYSICAL THERAPY TREATMENT NOTE - INPATIENT    Room Number: 376/376-A       Session: 4    Number of Visits to Meet Established Goals: 5    Presenting Problem: UE/LE weakness  Co-Morbidities : s/p C4-C6 CDR as an OP 3/12/2025    HOME SITUATION  Type of Home: House  Home Layout: Two level, Able to live on main level  Stairs to Enter : 2      Lives With: Spouse, Family    Drives: Yes   Patient Regularly Uses: None       Prior Level of [place]: Pt lives with spouse and 2 children (13 and 18 y.o) in 2 story home. Primary bed/bath is on the main level of the home. Pt active and independent at baseline.        PHYSICAL THERAPY ASSESSMENT   Patient demonstrates good  progress this session, goals  remain in progress.      Patient is requiring contact guard assist and minimal assist as a result of the following impairments: decreased functional strength, decreased endurance/aerobic capacity, pain, impaired dynamic balance, impaired coordination, impaired motor planning, and decreased muscular endurance.     Patient continues to function below baseline with bed mobility, transfers, gait, stair negotiation, standing prolonged periods, and performing household tasks.  Next session anticipate patient to progress transfers, gait, stair negotiation, and standing prolonged periods.  Physical Therapy will continue to follow patient for duration of hospitalization.    Patient continues to benefit from continued skilled PT services: to facilitate return to prior level of function as patient demonstrates high motivation with excellent tolerance to an intensive therapy program .    PLAN DURING HOSPITALIZATION  Nursing Mobility Recommendation : 1 Assist  PT Device Recommendation: Rolling walker  PT Treatment Plan: Bed mobility, Endurance, Energy conservation, Patient education, Gait training, Strengthening, Neuromuscular re-educate, Balance training, Transfer training, Stair training  Frequency (Obs): Daily     CURRENT GOALS      Goal #1 Patient is able to demonstrate supine - sit EOB @ level: minimum assistance   MET updated to SUPERVISION   Goal #2 Patient is able to demonstrate transfers EOB to/from C at assistance level: supervision   MET   Goal #3 Patient is able to ambulate 100 feet with assist device: walker - rolling at assistance level: minimum assistance   MET UPDATED 150 feet   Goal #4 Pt will demonstrate good understanding of spine precautions    Goal #5     Goal #6     Goal Comments: Goals established on 3/13/2025  3/19/2025 all goals ongoing      SUBJECTIVE  \"Yeah my body just gets so fatigued\"     OBJECTIVE  Precautions: Spine      PAIN ASSESSMENT   Rating: Unable to rate  Location: RUE, shoulder to forearm  Management Techniques: Body mechanics, Breathing techniques, Relaxation, Repositioning    BALANCE                                                                                                                       Static Sitting: Fair -  Dynamic Sitting: Fair -           Static Standing: Poor +  Dynamic Standing: Poor +    AM-PAC '6-Clicks' INPATIENT SHORT FORM - BASIC MOBILITY  How much difficulty does the patient currently have...  Patient Difficulty: Turning over in bed (including adjusting bedclothes, sheets and blankets)?: None   Patient Difficulty: Sitting down on and standing up from a chair with arms (e.g., wheelchair, bedside commode, etc.): A Little   Patient Difficulty: Moving from lying on back to sitting on the side of the bed?: A Little   How much help from another person does the patient currently need...   Help from Another: Moving to and from a bed to a chair (including a wheelchair)?: A Little   Help from Another: Need to walk in hospital room?: A Little   Help from Another: Climbing 3-5 steps with a railing?: A Lot     AM-PAC Score:  Raw Score: 18   Approx Degree of Impairment: 46.58%   Standardized Score (AM-PAC Scale): 43.63   CMS Modifier (G-Code): CK    FUNCTIONAL ABILITY STATUS  Gait  Assessment   Functional Mobility/Gait Assessment  Gait Assistance: Minimum assistance  Distance (ft): 20 x 4  Assistive Device: None (gait belt only)  Pattern: R Foot drag, L Steppage, R Foot flat, L Foot flat, Ataxic    Skilled Therapy Provided    Bed Mobility:  Rolling:   Supine<>Sit: SBA (HOB max elevated - swings legs out, already in a sitting position)  Sit<>Supine:     Transfer Mobility:  Sit<>Stand: CGA   Stand<>Sit: CGA   Gait: Min A without gait belt (CGA with RW)     Therapist's Comments:     pt with improved BUE usage - reported that it helped to not have to hold onto the walker  Standing forward taps to 4\" surfce 10 reps x 4 each leg  Standing heel raises  Standing marches with BUE support, and Standing marches with single UE support    Pt demos weak glute/quads when standing and poor R ant tib control during swing phase.     *Pt would benefit from theraband technique next session, or trial AFO    Patient End of Session: Up in chair, Needs met, Call light within reach, RN aware of session/findings, All patient questions and concerns addressed, Hospital anti-slip socks    PT Session Time: 45 minutes  Gait Training: 15 minutes  Therapeutic Activity: 15 minutes  Therapeutic Exercise:  minutes   Neuromuscular Re-education: 15 minutes

## 2025-03-19 NOTE — DISCHARGE SUMMARY
[facility] Hospitalist Discharge Summary     Patient ID:  Brigitte Ac  50 year old  2/18/1975    Admit date: 3/12/2025    Discharge date and time: 03/19/25     Attending Physician: Jose Hurd*     Primary Care Physician: Hu Orozco DO     Discharge Diagnoses: Weakness [R53.1]  Hx of cervical spine surgery [Z98.890]    Please note that only [facility] DMG and EMG patients enrolled in the Medicare ACO, [facility] ACO and [facility] HMOs will be handled by the \[facility] [place] [facility]\"" Care Management team.  For all other patients, please follow usual protocol for discharge care transition.    Discharge Condition: stable    Disposition:  IPR    Important Follow up:  - PCP within 2 weeks              Hospital Course:        51 y/o F w/ no major PMHx who presents after outpatient cervical spine surgery where she underwent a C4-C5, C5-C6 Anterior Cervical Discectomy and cervical disk replacement. Post operatively it appears the patient had notable weakness in the upper and lower extremities and was having difficulties bearing weight and walking/transferring on her own. ER evaluation was recommended. Patient admits to some parathesias in her fingers/toes that are still persisting but otherwise no other major complaints. Denies dizziness/lightheadedness, dysuria, n/v/d.  ED Vitals: wnl  Labs:  Stable     CT Cervical Spine: showed post surgical changes of microdiscectomy w/ disc prostheses at C4/C5 and C5/C6 levels, no subluxaion, no acute fracture  MRI Cervical Spine: post operative changes and osteophytes but otherwise no major issues.     Spine surgery was consulted, patient was given multiple doses of pain medications, Ativan, Zofran, and 1L NS bolus and admitted for observation overnight.    Ataxia/Parasthesias  Hx of Cervical Spine surgery 3/12/25  CT/MRIs noted, no major abnormalities  Plan:  - Spine surgery on consult, recommending IV steroids, Decadron 10mg q8h scheduled -  completed course  - Pepcid started while on steroids  - PRN pain control - on celebrex, valium and perocet, can try robaxin times 1 - dc with prn   - nasal congestion: flonase   - PT/OT evaluations      Consults: IP CONSULT TO HOSPITALIST  IP CONSULT TO ORTHO SPINE  IP CONSULT TO PHYSICAL MEDICINE REHAB    Operative Procedures:        Patient instructions:      I as the attending physician reconciled the current and discharge medications on day of discharge.     Current Discharge Medication List        CONTINUE these medications which have NOT CHANGED    Details   acetaminophen 500 MG Oral Tab Take 2 tablets (1,000 mg total) by mouth every 8 (eight) hours as needed for Pain.      Multiple Vitamins-Minerals (MULTIVITAMIN WOMEN OR) Take 1 tablet by mouth daily.      cyclobenzaprine 10 MG Oral Tab Take 1 tablet (10 mg total) by mouth 3 (three) times daily as needed.      HYDROcodone-acetaminophen  MG Oral Tab Take 1 tablet by mouth 3 (three) times daily as needed for Pain.             Activity: activity as tolerated  Diet: regular diet  Wound Care: as directed  Code Status: No Order      Discharge Exam:     General: no acute distress, alert and oriented x 3  Heart: RRR  Lungs: clear bilaterally, no active wheezing  Abdomen: nontender, nondistended, intact BS  Extremities: no pedal edema   Neuro: CN inact, no focal deficits      Total time coordinating care for discharge: Greater than 30 minutes    Patrick Hurd MD  [facility] ERist

## 2025-03-19 NOTE — PAYOR COMM NOTE
--------------  CONTINUED STAY REVIEW    Payor: [employer] OUT [employer] STATE PPO  Subscriber #:  [id number]  Authorization Number: [id number]    Admit date: 3/13/25  Admit time:  4:57 PM    3/18  ORTHO   Patient notes continued posterior neck pain.     3/18  ATTENDING    49 y/o F w/ no major PMHx who presents after outpatient cervical spine surgery where she underwent a C4-C5, C5-C6 Anterior Cervical Discectomy and cervical disk replacement. Presenting for upper/lower extremity weakness/ataxia     Ataxia/Parasthesias  Hx of Cervical Spine surgery 3/12/25  CT/MRIs noted, no major abnormalities  Plan:  - Spine surgery on consult, recommending IV steroids, Decadron 10mg q8h scheduled - completed   - Pepcid started while on steroids  - PRN pain control - on celebrex, valium and perocet, can try robaxin times 1  - nasal congestion: flonase   - PT/OT evaluations     DVT Ppx: no DVT until POD5 as per Spine - around 3/17     MEDICATIONS ADMINISTERED IN LAST 1 DAY:  benzocaine-menthol (Cepacol) lozenge 1 lozenge       Date Action Dose Route User    3/19/2025 0611 Given 1 lozenge Oral Magnus Card RN          celecoxib (CeleBREX) cap 100 mg       Date Action Dose Route User    3/19/2025 0831 Given 100 mg Oral Gato Gandhi, RN    3/18/2025 2057 Given 100 mg Oral Magnus Card RN          diazePAM (Valium) tab 10 mg       Date Action Dose Route User    3/19/2025 0611 Given 10 mg Oral CardMagnus cook RN    3/18/2025 2057 Given 10 mg Oral CardMagnus RN    3/18/2025 1412 Given 10 mg Oral Gato Gandhi RN          famotidine (Pepcid) tab 20 mg       Date Action Dose Route User    3/19/2025 0831 Given 20 mg Oral Gato Gandhi RN          fluticasone propionate (Flonase) 50 MCG/ACT nasal suspension 1 spray       Date Action Dose Route User    3/19/2025 0831 Given 1 spray Each Nare Gato Gandhi RN          oxyCODONE immediate release tab 10 mg       Date Action Dose Route User    3/19/2025 1034 Given 10 mg Oral  Gato Gandhi, RN    3/19/2025 0058 Given 10 mg Oral Magnus Card RN    3/18/2025 1706 Given 10 mg Oral Gato Gandhi RN          oxyCODONE-acetaminophen (Percocet) 5-325 MG per tab 2 tablet       Date Action Dose Route User    3/19/2025 0832 Given 2 tablet Oral Gato Gandhi RN    3/18/2025 2338 Given 2 tablet Oral Magnus Card RN    3/18/2025 1848 Given 2 tablet Oral Gato Gandhi RN            Vitals (last day)       Date/Time Temp Pulse Resp BP SpO2 Weight O2 Device O2 Flow Rate (L/min) [facility]    03/19/25 1133 98.6 °F (37 °C) 78 20 127/88 -- -- None (Room air) --     03/19/25 0814 98.6 °F (37 °C) 89 20 113/81 94 % -- None (Room air) --     03/19/25 0427 97.3 °F (36.3 °C) 89 20 101/74 93 % -- None (Room air) 0 L/min LB    03/19/25 0042 97.6 °F (36.4 °C) 73 16 107/74 92 % -- None (Room air) 0 L/min LB    03/18/25 1938 97.3 °F (36.3 °C) 85 20 102/77 97 % -- None (Room air) 0 L/min LB    03/18/25 1606 97.4 °F (36.3 °C) 83 16 111/73 -- -- None (Room air) -- SK    03/18/25 1606 -- -- -- -- 92 % -- -- --     03/18/25 1037 -- 86 -- -- 94 % -- -- -- SK    03/18/25 0929 -- 82 -- -- 95 % -- -- -- SK    03/18/25 0808 98.2 °F (36.8 °C) 73 16 120/92 98 % -- None (Room air) -- SK    03/18/25 0422 97.5 °F (36.4 °C) 72 16 116/93 95 % -- None (Room air) 0 L/min LB          CIWA Scores (since admission)       None

## 2025-03-19 NOTE — CM/SW NOTE
03/19/25 1524   Discharge disposition   Expected discharge disposition [facility]hab Fac[facility]   Post Acute Care Provider Brittney   Discharge transportation [facility] has auth and can accept today.  Derick Neumann to transport to [facility] Room 2202 at 5:30pm  RN to call report to [phone].

## 2025-03-19 NOTE — CM/SW NOTE
[facility] has auth for admission- they need CBC and BMP before they can accept.    Migdalia Paul, [facility]  /Discharge Planner  '

## 2025-03-19 NOTE — DIETARY NOTE
[facility]   part of [facility]   CLINICAL NUTRITION    Brigitte Ac     Admitting diagnosis:  Weakness [R53.1]  Hx of cervical spine surgery [Z98.890]    Ht: 162.6 cm (5' 4\")  Wt: 51.7 kg (114 lb).   Body mass index is 19.57 kg/m².    Wt Readings from Last 6 Encounters:   03/14/25 51.7 kg (114 lb)   03/12/25 51.7 kg (114 lb)   03/09/22 59.9 kg (132 lb)   03/03/21 59.9 kg (132 lb)   12/09/20 59 kg (130 lb)   11/19/20 59 kg (130 lb)      Labs/Meds reviewed    Diet:       Procedures    Regular/General diet Is Patient on Accuchecks? No     Percent Meals Eaten (last 3 days)       None          Pt chart reviewed d/t LOS.  Patient reports fair appetite at this time.  Nursing notes reports   intake for last meal.  Tolerating po diet without diarrhea, emesis, or constipation.   No significant weight changes noted.     Patient is at low nutrition risk at this time.    Please consult if patient status changes or nutrition issues arise.    Fátima Patricia RD, LDN  Clinical Dietitian

## 2025-03-20 ENCOUNTER — APPOINTMENT (OUTPATIENT)
Dept: CT IMAGING | Facility: HOSPITAL | Age: 50
End: 2025-03-20
Attending: EMERGENCY MEDICINE
Payer: COMMERCIAL

## 2025-03-20 ENCOUNTER — HOSPITAL ENCOUNTER (INPATIENT)
Facility: HOSPITAL | Age: 50
LOS: 1 days | Discharge: NURSING, REHAB OR LONG-TERM CARE | End: 2025-03-21
Attending: EMERGENCY MEDICINE | Admitting: HOSPITALIST
Payer: COMMERCIAL

## 2025-03-20 ENCOUNTER — APPOINTMENT (OUTPATIENT)
Dept: GENERAL RADIOLOGY | Facility: HOSPITAL | Age: 50
End: 2025-03-20
Attending: EMERGENCY MEDICINE
Payer: COMMERCIAL

## 2025-03-20 DIAGNOSIS — J16.0 PNEUMONIA OF RIGHT LOWER LOBE DUE TO CHLAMYDIA SPECIES: Primary | ICD-10-CM

## 2025-03-20 DIAGNOSIS — G93.41 METABOLIC ENCEPHALOPATHY: ICD-10-CM

## 2025-03-20 DIAGNOSIS — R65.20 SEVERE SEPSIS (HCC): ICD-10-CM

## 2025-03-20 DIAGNOSIS — D72.829 LEUKOCYTOSIS, UNSPECIFIED TYPE: ICD-10-CM

## 2025-03-20 DIAGNOSIS — A41.9 SEVERE SEPSIS (HCC): ICD-10-CM

## 2025-03-20 PROBLEM — R50.9 FEVER: Status: ACTIVE | Noted: 2025-03-20

## 2025-03-20 LAB
ADENOVIRUS PCR:: NOT DETECTED
ALBUMIN SERPL-MCNC: 4.3 G/DL (ref 3.2–4.8)
ALBUMIN/GLOB SERPL: 1.3 {RATIO} (ref 1–2)
ALP LIVER SERPL-CCNC: 64 U/L
ALT SERPL-CCNC: 15 U/L
ANION GAP SERPL CALC-SCNC: 10 MMOL/L (ref 0–18)
AST SERPL-CCNC: 17 U/L (ref ?–34)
B PARAPERT DNA SPEC QL NAA+PROBE: NOT DETECTED
B PERT DNA SPEC QL NAA+PROBE: NOT DETECTED
BASOPHILS # BLD AUTO: 0.07 X10(3) UL (ref 0–0.2)
BASOPHILS NFR BLD AUTO: 0.5 %
BILIRUB SERPL-MCNC: 0.3 MG/DL (ref 0.3–1.2)
BILIRUB UR QL STRIP.AUTO: NEGATIVE
BUN BLD-MCNC: 9 MG/DL (ref 9–23)
C PNEUM DNA SPEC QL NAA+PROBE: DETECTED
CALCIUM BLD-MCNC: 9.6 MG/DL (ref 8.7–10.6)
CHLORIDE SERPL-SCNC: 100 MMOL/L (ref 98–112)
CLARITY UR REFRACT.AUTO: CLEAR
CO2 SERPL-SCNC: 28 MMOL/L (ref 21–32)
COLOR UR AUTO: COLORLESS
CORONAVIRUS 229E PCR:: NOT DETECTED
CORONAVIRUS HKU1 PCR:: NOT DETECTED
CORONAVIRUS NL63 PCR:: NOT DETECTED
CORONAVIRUS OC43 PCR:: NOT DETECTED
CREAT BLD-MCNC: 0.87 MG/DL
EGFRCR SERPLBLD CKD-EPI 2021: 80 ML/MIN/1.73M2 (ref 60–?)
EOSINOPHIL # BLD AUTO: 0.39 X10(3) UL (ref 0–0.7)
EOSINOPHIL NFR BLD AUTO: 2.6 %
ERYTHROCYTE [DISTWIDTH] IN BLOOD BY AUTOMATED COUNT: 12.1 %
FLUAV + FLUBV RNA SPEC NAA+PROBE: NEGATIVE
FLUAV + FLUBV RNA SPEC NAA+PROBE: NEGATIVE
FLUAV RNA SPEC QL NAA+PROBE: NOT DETECTED
FLUBV RNA SPEC QL NAA+PROBE: NOT DETECTED
GLOBULIN PLAS-MCNC: 3.2 G/DL (ref 2–3.5)
GLUCOSE BLD-MCNC: 105 MG/DL (ref 70–99)
GLUCOSE UR STRIP.AUTO-MCNC: NORMAL MG/DL
HCT VFR BLD AUTO: 38.4 %
HGB BLD-MCNC: 13.4 G/DL
IMM GRANULOCYTES # BLD AUTO: 0.06 X10(3) UL (ref 0–1)
IMM GRANULOCYTES NFR BLD: 0.4 %
KETONES UR STRIP.AUTO-MCNC: NEGATIVE MG/DL
LACTATE SERPL-SCNC: 0.8 MMOL/L (ref 0.5–2)
LEUKOCYTE ESTERASE UR QL STRIP.AUTO: NEGATIVE
LIPASE SERPL-CCNC: 29 U/L (ref 12–53)
LYMPHOCYTES # BLD AUTO: 2.09 X10(3) UL (ref 1–4)
LYMPHOCYTES NFR BLD AUTO: 14.2 %
MCH RBC QN AUTO: 30.5 PG (ref 26–34)
MCHC RBC AUTO-ENTMCNC: 34.9 G/DL (ref 31–37)
MCV RBC AUTO: 87.3 FL
METAPNEUMOVIRUS PCR:: NOT DETECTED
MONOCYTES # BLD AUTO: 0.64 X10(3) UL (ref 0.1–1)
MONOCYTES NFR BLD AUTO: 4.3 %
MYCOPLASMA PNEUMONIA PCR:: NOT DETECTED
NEUTROPHILS # BLD AUTO: 11.49 X10 (3) UL (ref 1.5–7.7)
NEUTROPHILS # BLD AUTO: 11.49 X10(3) UL (ref 1.5–7.7)
NEUTROPHILS NFR BLD AUTO: 78 %
NITRITE UR QL STRIP.AUTO: NEGATIVE
OSMOLALITY SERPL CALC.SUM OF ELEC: 285 MOSM/KG (ref 275–295)
PARAINFLUENZA 1 PCR:: NOT DETECTED
PARAINFLUENZA 2 PCR:: NOT DETECTED
PARAINFLUENZA 3 PCR:: NOT DETECTED
PARAINFLUENZA 4 PCR:: NOT DETECTED
PH UR STRIP.AUTO: 7.5 [PH] (ref 5–8)
PLATELET # BLD AUTO: 374 10(3)UL (ref 150–450)
POTASSIUM SERPL-SCNC: 3.7 MMOL/L (ref 3.5–5.1)
PROT SERPL-MCNC: 7.5 G/DL (ref 5.7–8.2)
PROT UR STRIP.AUTO-MCNC: NEGATIVE MG/DL
RBC # BLD AUTO: 4.4 X10(6)UL
RBC UR QL AUTO: NEGATIVE
RHINOVIRUS/ENTERO PCR:: NOT DETECTED
RSV RNA SPEC NAA+PROBE: NEGATIVE
RSV RNA SPEC QL NAA+PROBE: NOT DETECTED
SARS-COV-2 RNA NPH QL NAA+NON-PROBE: NOT DETECTED
SARS-COV-2 RNA RESP QL NAA+PROBE: NOT DETECTED
SODIUM SERPL-SCNC: 138 MMOL/L (ref 136–145)
SP GR UR STRIP.AUTO: 1.01 (ref 1–1.03)
TROPONIN I SERPL HS-MCNC: 3 NG/L
UROBILINOGEN UR STRIP.AUTO-MCNC: NORMAL MG/DL
WBC # BLD AUTO: 14.7 X10(3) UL (ref 4–11)

## 2025-03-20 PROCEDURE — 87086 URINE CULTURE/COLONY COUNT: CPT | Performed by: EMERGENCY MEDICINE

## 2025-03-20 PROCEDURE — 0202U NFCT DS 22 TRGT SARS-COV-2: CPT | Performed by: EMERGENCY MEDICINE

## 2025-03-20 PROCEDURE — 96365 THER/PROPH/DIAG IV INF INIT: CPT

## 2025-03-20 PROCEDURE — 85025 COMPLETE CBC W/AUTO DIFF WBC: CPT | Performed by: EMERGENCY MEDICINE

## 2025-03-20 PROCEDURE — 96375 TX/PRO/DX INJ NEW DRUG ADDON: CPT

## 2025-03-20 PROCEDURE — 83605 ASSAY OF LACTIC ACID: CPT | Performed by: EMERGENCY MEDICINE

## 2025-03-20 PROCEDURE — 96366 THER/PROPH/DIAG IV INF ADDON: CPT

## 2025-03-20 PROCEDURE — 70450 CT HEAD/BRAIN W/O DYE: CPT | Performed by: EMERGENCY MEDICINE

## 2025-03-20 PROCEDURE — 70491 CT SOFT TISSUE NECK W/DYE: CPT | Performed by: EMERGENCY MEDICINE

## 2025-03-20 PROCEDURE — 84484 ASSAY OF TROPONIN QUANT: CPT | Performed by: EMERGENCY MEDICINE

## 2025-03-20 PROCEDURE — 99285 EMERGENCY DEPT VISIT HI MDM: CPT

## 2025-03-20 PROCEDURE — 87040 BLOOD CULTURE FOR BACTERIA: CPT | Performed by: EMERGENCY MEDICINE

## 2025-03-20 PROCEDURE — 94760 N-INVAS EAR/PLS OXIMETRY 1: CPT

## 2025-03-20 PROCEDURE — 0241U SARS-COV-2/FLU A AND B/RSV BY PCR (GENEXPERT): CPT | Performed by: EMERGENCY MEDICINE

## 2025-03-20 PROCEDURE — 81003 URINALYSIS AUTO W/O SCOPE: CPT | Performed by: EMERGENCY MEDICINE

## 2025-03-20 PROCEDURE — 80053 COMPREHEN METABOLIC PANEL: CPT | Performed by: EMERGENCY MEDICINE

## 2025-03-20 PROCEDURE — 83690 ASSAY OF LIPASE: CPT | Performed by: EMERGENCY MEDICINE

## 2025-03-20 PROCEDURE — 71045 X-RAY EXAM CHEST 1 VIEW: CPT | Performed by: EMERGENCY MEDICINE

## 2025-03-20 RX ORDER — HYDROCODONE BITARTRATE AND ACETAMINOPHEN 10; 325 MG/1; MG/1
1 TABLET ORAL 3 TIMES DAILY PRN
Status: DISCONTINUED | OUTPATIENT
Start: 2025-03-20 | End: 2025-03-21

## 2025-03-20 RX ORDER — DIAZEPAM 5 MG/1
5 TABLET ORAL EVERY 6 HOURS PRN
Status: DISCONTINUED | OUTPATIENT
Start: 2025-03-20 | End: 2025-03-21

## 2025-03-20 RX ORDER — SENNOSIDES 8.6 MG
17.2 TABLET ORAL NIGHTLY PRN
Status: DISCONTINUED | OUTPATIENT
Start: 2025-03-20 | End: 2025-03-21

## 2025-03-20 RX ORDER — ACETAMINOPHEN 500 MG
1000 TABLET ORAL ONCE
Status: COMPLETED | OUTPATIENT
Start: 2025-03-20 | End: 2025-03-20

## 2025-03-20 RX ORDER — BISACODYL 10 MG
10 SUPPOSITORY, RECTAL RECTAL
Status: DISCONTINUED | OUTPATIENT
Start: 2025-03-20 | End: 2025-03-21

## 2025-03-20 RX ORDER — ACETAMINOPHEN 500 MG
1000 TABLET ORAL EVERY 8 HOURS PRN
Status: DISCONTINUED | OUTPATIENT
Start: 2025-03-20 | End: 2025-03-21

## 2025-03-20 RX ORDER — POLYETHYLENE GLYCOL 3350 17 G/17G
17 POWDER, FOR SOLUTION ORAL DAILY PRN
Status: DISCONTINUED | OUTPATIENT
Start: 2025-03-20 | End: 2025-03-21

## 2025-03-20 RX ORDER — ACETAMINOPHEN 500 MG
500 TABLET ORAL EVERY 4 HOURS PRN
Status: DISCONTINUED | OUTPATIENT
Start: 2025-03-20 | End: 2025-03-20

## 2025-03-20 RX ORDER — ENOXAPARIN SODIUM 100 MG/ML
40 INJECTION SUBCUTANEOUS DAILY
Status: DISCONTINUED | OUTPATIENT
Start: 2025-03-20 | End: 2025-03-21

## 2025-03-20 RX ORDER — CYCLOBENZAPRINE HCL 10 MG
10 TABLET ORAL 3 TIMES DAILY PRN
Status: DISCONTINUED | OUTPATIENT
Start: 2025-03-20 | End: 2025-03-21

## 2025-03-20 RX ORDER — ONDANSETRON 2 MG/ML
4 INJECTION INTRAMUSCULAR; INTRAVENOUS EVERY 6 HOURS PRN
Status: DISCONTINUED | OUTPATIENT
Start: 2025-03-20 | End: 2025-03-21

## 2025-03-20 RX ORDER — SODIUM PHOSPHATE, DIBASIC AND SODIUM PHOSPHATE, MONOBASIC 7; 19 G/230ML; G/230ML
1 ENEMA RECTAL ONCE AS NEEDED
Status: DISCONTINUED | OUTPATIENT
Start: 2025-03-20 | End: 2025-03-21

## 2025-03-20 RX ORDER — AZITHROMYCIN 250 MG/1
500 TABLET, FILM COATED ORAL
Status: DISCONTINUED | OUTPATIENT
Start: 2025-03-21 | End: 2025-03-21

## 2025-03-20 RX ORDER — VANCOMYCIN HYDROCHLORIDE
25 ONCE
Status: COMPLETED | OUTPATIENT
Start: 2025-03-20 | End: 2025-03-20

## 2025-03-20 RX ORDER — PROCHLORPERAZINE EDISYLATE 5 MG/ML
5 INJECTION INTRAMUSCULAR; INTRAVENOUS EVERY 8 HOURS PRN
Status: DISCONTINUED | OUTPATIENT
Start: 2025-03-20 | End: 2025-03-21

## 2025-03-20 NOTE — PLAN OF CARE
Very drowsy, easy to wake. VSS. On RA. No c/o pain. Weakness to BUE's. SS to right anterior neck D/I. SCD's on. Voiding without issue. Up mod x 2. Plan TBD. Will continue to monitor.

## 2025-03-20 NOTE — ED INITIAL ASSESSMENT (HPI)
pt had c4-c6 anterior cervical discectomy on 3/12, now has fever of 102, chills, malaise. being sent to R/O sepsis. From [facility]

## 2025-03-20 NOTE — CONSULTS
Infectious Disease Initial Consultation      Date of admission: 3/20/2025 12:37 AM     Date of service: 03/20/25 10:52 AM    Consult requested by: Jose Hurd*    Reason for consult: Fever and confusion    Chief complaint: Fever and confusion    History of present illness: Brigitte Ac is a 50 year old female with with history of cervical radiculopathy status post C4-C5 and C5-C6 anterior cervical discectomy 9 days ago, who presents here with high fevers and confusion.    In the emergency room, patient was afebrile, hemodynamically stable.  Labs revealed leukocytosis white count of 14.7 with a left shift.  CMP was unremarkable.  UA was unremarkable.  RVP positive for chlamydia pneumonia.  2 sets of blood cultures were done, remain negative to date.  The patient was started on IV Zosyn and IV vancomycin initially, azithromycin was added today after her RVP came back positive for chlamydia pneumoniae.  Chest x-ray showed mild airspace opacity in the right lower lobe consistent with pneumonia.  CT soft tissue neck showed postsurgical changes, teratoma or hematoma.  However, an abscess could not be ruled out.    Today, she is feeling better.  She is not complaining of any worsening pain.  Her incision continues to heal without any signs of infection.    Review of systems:  All other components of the review of systems are negative, except those described in the history of present illness.     Past Medical History:    Hospitalism    none     History reviewed. No pertinent surgical history.  Social History     Socioeconomic History    Marital status:     Number of children: 2   Occupational History    Occupation: Sales   Tobacco Use    Smoking status: Never    Smokeless tobacco: Never   Vaping Use    Vaping status: Never Used   Substance and Sexual Activity    Alcohol use: Yes     Alcohol/week: 0.0 standard drinks of alcohol     Comment: socially    Drug use: No    Sexual activity: Yes     Partners:  Male     Social Drivers of Health     Food Insecurity: No Food Insecurity (3/20/2025)    NCSS - Food Insecurity     Worried About Running Out of Food in the Last Year: No     Ran Out of Food in the Last Year: No   Transportation Needs: No Transportation Needs (3/20/2025)    NCSS - Transportation     Lack of Transportation: No   Housing Stability: Not At Risk (3/20/2025)    NCSS - Housing/Utilities     Has Housing: Yes     Worried About Losing Housing: No     Unable to Get Utilities: No     Family History   Problem Relation Age of Onset    Hypertension Father     Diabetes Mother     Heart Disorder Mother 50        CABG    Cancer Maternal Grandmother         breast     Reviewed, see above    Medications:    lactated ringers    melatonin    ondansetron    prochlorperazine    polyethylene glycol (PEG 3350)    sennosides    bisacodyl    fleet enema    piperacillin-tazobactam    [START ON 3/21/2025] azithromycin    acetaminophen    cyclobenzaprine    diazePAM    HYDROcodone-acetaminophen    vancomycin     Allergies:  Allergies[1]    Physical Exam:  Vitals:    03/20/25 0848   BP: 125/83   Pulse: 69   Resp: 18   Temp: 96.6 °F (35.9 °C)     Vitals signs and nursing note reviewed.   Constitutional:       Appearance: Normal appearance.   HENT:      Head: Normocephalic and atraumatic.      Mouth: Mucous membranes are moist.   Neck:      Musculoskeletal: Neck supple.  Anterior neck surgical incision noted without any signs of infection.  Cardiovascular:      Rate and Rhythm: Normal rate.      Heart sounds: Normal heart sounds. No murmur. No friction rub. No gallop.    Pulmonary:      Effort: Pulmonary effort is normal. No respiratory distress.      Breath sounds: Normal breath sounds. No stridor. No wheezing, rhonchi or rales.   Chest:      Chest wall: No tenderness.   Abdominal:      General: Abdomen is flat. There is no distension.      Palpations: Abdomen is soft. There is no mass.      Tenderness: There is no tenderness.  There is no guarding or rebound.      Hernia: No hernia is present.   Musculoskeletal:      Right lower leg: No edema.      Left lower leg: No edema.   Skin:     General: Skin is warm and dry.   Neurological:      General: No focal deficit present.      Mental Status: Alert and oriented to person, place, and time.     Laboratory data:  I have independently reviewed all lab results; including old microbiological results.  Lab Results   Component Value Date    WBC 14.7 03/20/2025    HGB 13.4 03/20/2025    HCT 38.4 03/20/2025    .0 03/20/2025    CREATSERUM 0.87 03/20/2025    BUN 9 03/20/2025     03/20/2025    K 3.7 03/20/2025     03/20/2025    CO2 28.0 03/20/2025     03/20/2025    CA 9.6 03/20/2025    ALB 4.3 03/20/2025    ALKPHO 64 03/20/2025    BILT 0.3 03/20/2025    TP 7.5 03/20/2025    AST 17 03/20/2025    ALT 15 03/20/2025    LIP 29 03/20/2025    TROPHS 3 03/20/2025        Recent Labs   Lab 03/20/25  0049   RBC 4.40   HGB 13.4   HCT 38.4   MCV 87.3   MCH 30.5   MCHC 34.9   RDW 12.1   NEPRELIM 11.49*   WBC 14.7*   .0       Microbiology data:  No results found for this visit on 03/20/25.      Radiology:  I have reviewed all imaging data available independently.   Chest x-ray:  Right lower lobe pneumonia    CT soft tissue neck:  Postoperative changes from a right anterior approach C4-C5 and C5-C6 discectomy.  Fluid tracks along the incision site and at the prevertebral soft tissue extending inferiorly to the level of the thyroid gland with a thin margin of peripheral   enhancement.  While these findings could represent postsurgical fluid (such as a seroma or hematoma) and granulation tissue, superimposed infection with early abscess formation remains a differential consideration.  No air is seen within this collection   to suggest a mature abscess.  Neurosurgical consultation is suggested.  Fluid aspiration could be performed for further assessment as clinically appropriate.      This  fluid collection measures up to 5.9 x 3 cm in the axial plane.  This collection measures 5.4 cm in the CC dimension.     Impression:  Brigitte Ac is a 50 year old female with     Chlamydia pneumoniae pneumonia presenting here with high fever and leukocytosis, concerning for evolving sepsis with threat to life  Chest x-ray with right lower lobe pneumonia, most likely causing high fever and leukocytosis  CT soft tissue neck noted  Currently on IV Zosyn and azithromycin and vancomycin  Recent C-spine discectomy  Surgical incision noted without any signs of infection  CT is representing most likely post surgical changes    Recommendations:     Discontinue Zosyn and vancomycin  Continue azithromycin 500 mg daily, can be switched to p.o.  Supportive care as per the primary team  Continue to monitor daily labs for antibiotic toxicity  Further recommendations will depend on the above work-up and clinical progress     The plan of care was discussed with the primary hospital team, Jose Hurd*     Recommendations were also discussed with the patient; all questions were answered.     Thank you for this consultation. Please don't hesitate to call the ID team for questions or any acute changes in patient's clinical condition.    Please note that this report has been produced using speech recognition software and may contain errors related to that system including, but not limited to, errors in grammar, punctuation, and spelling, as well as words and phrases that possibly may have been recognized inappropriately.  If there are any questions or concerns, contact the dictating provider for clarification.    The 21st Century Cures Act makes medical notes like these available to patients in the interest of transparency. Please be advised this is a medical document. Medical documents are intended to carry relevant information, facts as evident, and the clinical opinion of the practitioner. The medical note is intended  as peer to peer communication and may appear blunt or direct. It is written in medical language and may contain abbreviations or verbiage that are unfamiliar.     Claudette aLmbert MD  [facility]. Tel: [phone]. Fax: [phone].     Brigitte Ac : 1975 MRN: [id number] CSN: [id number]          [1] No Known Allergies

## 2025-03-20 NOTE — H&P
[facility] History and Physical      Chief Complaint   Patient presents with    Postop/Procedure Problem        PCP: Hu Orozco DO      History of Present Illness: Patient is a 50 year old female with PMH sig for C4-C6 Anterior Cervical Discectomy by Dr. York on 3/12 complicated by delayed recovery postop who was just discharged from [facility] on [facility]/19 returned later in the day after she started having fevers and AMS. She was fairly altered and was a poor historian in the ED.   In the ED VSS. Labs with leukocytosis, CXR with RLL opacity and her RVP was positive for chlamydia pneumoniae. CT soft tissue neck with expect postsurgical changes however abscess could not be ruled out. Started on BS abx with zosyn, vanc azithro and admitted for further evaluation and treatment.     Past Medical History:    Hospitalism    none      History reviewed. No pertinent surgical history.     ALL:  Allergies[1]     No current outpatient medications on file.       Social History     Tobacco Use    Smoking status: Never    Smokeless tobacco: Never   Substance Use Topics    Alcohol use: Yes     Alcohol/week: 0.0 standard drinks of alcohol     Comment: socially        Fam Hx  Family History   Problem Relation Age of Onset    Hypertension Father     Diabetes Mother     Heart Disorder Mother 50        CABG    Cancer Maternal Grandmother         breast       Review of Systems  Comprehensive ROS reviewed and negative except for what is stated in HPI.      OBJECTIVE:  /83 (BP Location: Left arm)   Pulse 69   Temp 96.6 °F (35.9 °C) (Axillary)   Resp 18   Wt 114 lb (51.7 kg)   LMP 01/22/2025 (Approximate)   SpO2 95%   BMI 19.57 kg/m²   General:  Alert, no distress, appears stated age.    Head:  Normocephalic, without obvious abnormality, atraumatic.   Eyes:  Sclera anicteric, No conjunctival pallor, EOMs intact.    Nose: Nares normal. Septum midline. Mucosa normal. No drainage.   Throat: Lips, mucosa, and tongue  normal. Teeth and gums normal.   Neck: Supple, symmetrical, trachea midline, no cervical or supraclavicular lymph adenopathy, thyroid: no enlargment/tenderness/nodules appreciated. Neck incision c/d/I    Lungs:   Clear to auscultation bilaterally. Normal effort   Chest wall:  No tenderness or deformity.   Heart:  Regular rate and rhythm, S1, S2 normal, no murmur, rub or gallop appreciated   Abdomen:   Soft, non-tender. Bowel sounds normal. No masses,  No organomegaly. Non distended   Extremities: Extremities normal, atraumatic, no cyanosis or edema.   Skin: Skin color, texture, turgor normal. No rashes or lesions.    Neurologic: Normal strength, no focal deficit appreciated     Data Review:    LABS:   Lab Results   Component Value Date    WBC 14.7 03/20/2025    HGB 13.4 03/20/2025    HCT 38.4 03/20/2025    .0 03/20/2025    CREATSERUM 0.87 03/20/2025    BUN 9 03/20/2025     03/20/2025    K 3.7 03/20/2025     03/20/2025    CO2 28.0 03/20/2025     03/20/2025    CA 9.6 03/20/2025    ALB 4.3 03/20/2025    ALKPHO 64 03/20/2025    BILT 0.3 03/20/2025    TP 7.5 03/20/2025    AST 17 03/20/2025    ALT 15 03/20/2025    LIP 29 03/20/2025       CXR: All imaging personally reviewed.      Radiology: XR CHEST AP PORTABLE  (CPT=71045)    Result Date: 3/20/2025  PROCEDURE:  XR CHEST AP PORTABLE  (CPT=71045)  TECHNIQUE:  AP chest radiograph was obtained.  COMPARISON:  None.  INDICATIONS:  PNA  PATIENT STATED HISTORY: (As transcribed by Technologist)  Patient offered no additional history at this time.    FINDINGS:  There is some mild airspace opacity within the medial aspect of the right lower lobe, perhaps representing atelectasis or early pneumonia.  Short-term follow-up recommended.  The remainder of the lungs are clear.  Cardiac silhouette and pulmonary vasculature are within normal limits.  There is a upper thoracic levoscoliosis and mid to lower thoracic compensatory dextroscoliosis with moderate  multilevel disc disease.            CONCLUSION:  1. Mild airspace opacity in the medial aspect of the right lower lobe concerning for atelectasis versus early pneumonia. 2. The preliminary report was reviewed.  Please see further details above.   LOCATION:  [facility]      Dictated by (CST): Juanito Garcia DO on 3/20/2025 at 7:48 AM     Finalized by (CST): Juanito Garcia DO on 3/20/2025 at 7:50 AM       CT SOFT TISSUE OF NECK(CONTRAST ONLY) (CPT=70491)    Result Date: 3/20/2025  PROCEDURE:  CT SOFT TISSUE OF NECK(CONTRAST ONLY) (CPT=70491)  COMPARISON:  None.  INDICATIONS:  eval for post op infection  TECHNIQUE:  IV contrast-enhanced multislice CT scanning is performed through the neck soft tissues during administration of nonionic contrast.  Dose reduction techniques were used. Dose information is transmitted to the ACR (American College of Radiology) NRDR (National Radiology Data Registry) which includes the Dose Index Registry.  PATIENT STATED HISTORY:(As transcribed by Technologist)  Fever, neck pain, fatigue, post cervical diskectomy.   CONTRAST USED:  50cc of Isovue 370  FINDINGS:   Postoperative changes from a right anterior approach C4-C5 and C5-C6 discectomy.  Fluid tracks along the incision site and at the prevertebral soft tissue extending inferiorly to the level of the thyroid gland with a thin margin of peripheral enhancement.  While these findings could represent postsurgical fluid (such as a seroma or hematoma) and granulation tissue, superimposed infection with early abscess formation remains a differential consideration.  No air is seen within this collection to suggest a mature abscess.  Neurosurgical consultation is suggested.  Fluid aspiration could be performed for further assessment as clinically appropriate.  This fluid collection measures up to 5.9 x 3 cm in the axial plane.  This collection measures 5.4 cm in the CC dimension.  No cervical adenopathy is identified.  Unremarkable CT appearance of  the parotid, submandibular, and thyroid glands.  Degenerative changes in the spine.  Mild to moderate left convex curvature of the upper thoracic spine.            CONCLUSION:  See above.  A preliminary report was provided by the [facility] teleradiology service.    LOCATION:  [facility]    Dictated by (CST): Stromberg, LeRoy, MD on 3/20/2025 at 5:29 AM     Finalized by (CST): Stromberg, LeRoy, MD on 3/20/2025 at 5:38 AM       CT BRAIN OR HEAD (CPT=70450)    Result Date: 3/20/2025  PROCEDURE:  CT BRAIN OR HEAD (18362)  COMPARISON:  [facility] , CT, CT SOFT TISSUE OF NECK(CONTRAST ONLY) (CPT=70491), 3/20/2025, 3:51 AM.  INDICATIONS:  sepsis, AMS, eval for ICH  TECHNIQUE:  Noncontrast CT scanning is performed through the brain. Dose reduction techniques were used. Dose information is transmitted to the ACR (American College of Radiology) NRDR (National Radiology Data Registry) which includes the Dose Index Registry.  PATIENT STATED HISTORY: (As transcribed by Technologist)  Confusion, fever, lethargy post cervical diskectomy.   FINDINGS:  Ventricles and sulci are within normal limits.  There is no focal parenchymal attenuation abnormality.  There is no midline shift or mass-effect.  The basal cisterns are patent.  The gray-white matter differentiation is intact.  There is no acute intracranial hemorrhage or extra-axial fluid collection.   There is no evident fracture.  Small air-fluid level at the right maxillary sinus.  Moderate opacification of the right maxillary sinus.  Moderate opacification of the ethmoid sinus.            CONCLUSION:  1. No acute intracranial abnormality. If there is clinical concern for acute ischemia/infarction, an MRI of the brain would be recommended for further evaluation. 2. Paranasal sinus mucosal disease as above.  Clinical correlation is suggested to exclude sinusitis.    LOCATION:  [facility]   Dictated by (CST): Stromberg, LeRoy, MD on 3/20/2025 at 5:25 AM     Finalized by (CST): Stromberg, LeRoy,  MD on 3/20/2025 at 5:29 AM       XR CERVICAL SPINE (2-3 VIEWS) (CPT=72040)    Result Date: 3/19/2025  PROCEDURE:  XR CERVICAL SPINE (2 VIEWS) (CPT=72040)  COMPARISON:  None.  INDICATIONS:  post op cervical disc replacement  PATIENT STATED HISTORY: (As transcribed by Technologist)  Post-op cervical disc replacement.    FINDINGS:  Interval postoperative changes related to C4-C5 and C5-C6 intervertebral disc replacement.  There is mild prevertebral soft tissue swelling, which may be postoperative.  The C3-C4 and C6-C7 disc heights demonstrate moderate to severe disc height loss.  The vertebral body heights are maintained.  Multilevel endplate degenerative changes with osteophyte formation are present.  Mild straightening of the cervical lordosis.  No significant lateral curvature.            CONCLUSION:  See above   LOCATION:  [facility]   Dictated by (CST): Jaylen Verduzco MD on 3/19/2025 at 3:26 PM     Finalized by (CST): Jaylen Verduzco MD on 3/19/2025 at 3:27 PM       MRI SPINE CERVICAL (CPT=72141)    Result Date: 3/12/2025  PROCEDURE:  MRI SPINE CERVICAL (CPT=72141)  COMPARISON:  [facility], CT SPINE CERVICAL (CPT=72125), 3/12/2025, 12:43 PM.  INDICATIONS:  numbness and tingling during procedure  TECHNIQUE:  Multiplanar T1 and T2 weighted images including fat suppression sequences.  Images acquired in sagittal and axial planes.   PATIENT STATED HISTORY: (As transcribed by Technologist)  Patient states she lost feeling to her hands and feet after a procedure to her anterior cervical spine today.    FINDINGS:  CERVICAL DISC LEVELS: C2-C3:  No significant disc/facet abnormality, spinal stenosis, or foraminal narrowing. C3-C4:  There is diffuse disc osteophyte complex.  There are bilateral uncovertebral joint osteophytes.  There is mild to moderate central canal stenosis.  There is moderate bilateral foraminal stenosis. C4-C5:  There has been recent surgery at this level.  There is fluid signal in the disc space around the  disc space device.  Fluid also extends into the perispinal soft tissues anteriorly.  Based on MRI there is persistent endplate osteophyte at this level which causes moderate central canal stenosis.  Within the limits of this study due to motion there is no evidence of significant cord signal abnormality.  In particular on the STIR images study is limited by motion. C5-C6:  There are similar findings at the C5-C6 level from recent surgery.  There is persistent endplate osteophyte and bilateral uncovertebral joint osteophyte which causes mild to moderate central canal and moderate bilateral foraminal stenosis.  There  is no cord signal abnormality detected within the limits of this study. C6-C7:  Mild diffuse disc osteophyte complex and bilateral uncovertebral joint osteophytes cause mild central canal and bilateral foraminal stenosis. C7-T1:  No significant disc/facet abnormality, spinal stenosis, or foraminal narrowing.  CRANIOCERVICAL AREA:  Normal foramen magnum with no Chiari malformation.  PARASPINAL AREA:  There is extensive perispinal edema around the fused levels and extending into anterior paraspinal space. BONY STRUCTURES:  Postoperative changes are again noted at C4-C5 and C5-C6. CORD:  As noted above there is no specific evidence of cord signal abnormality allowing for limitations in this study due to motion.            CONCLUSION:  1. Expected postoperative changes are noted at C4-C5 and C5-C6 with fluid signal within the disc space surrounding the disc space device and also in the perispinal soft tissues anteriorly. 2. Persistent osteophytes cause narrowing of the central canal at the fused levels.  Within the limits of this study there is no significant cord signal abnormality.   LOCATION:  [facility]   Dictated by (CST): Quincy Biggs MD on 3/12/2025 at 3:46 PM     Finalized by (CST): Quincy Biggs MD on 3/12/2025 at 3:53 PM       CT SPINE CERVICAL (CPT=72125)    Result Date: 3/12/2025  PROCEDURE:  CT  SPINE CERVICAL (CPT=72125)  COMPARISON:  None.  INDICATIONS:  cervical spine with fine cuts  TECHNIQUE:  Noncontrast CT scanning of the cervical spine is performed from the skull base through C7.  Multiplanar reconstructions are generated.  Dose reduction techniques were used. Dose information is transmitted to the ACR (American College of Radiology) NRDR (National Radiology Data Registry) which includes the Dose Index Registry.  PATIENT STATED HISTORY: (As transcribed by Technologist)  Patient had cervical disc replacement today, during procedure patient began having decrease sensation and numbness to bilat hands and feet. Sent to ER.    FINDINGS:  CRANIOCERVICAL AREA:  Normal foramen magnum with no Chiari malformation. PARASPINAL AREA:  There is free air in the soft tissues of the anterior upper chest wall with pneumo mediastinum extending in the anterior prevertebral space from recent surgery.  BONES:  There is mixed sclerosis involving the cervical vertebral bodies.  This may be metabolic.  There is endplate hypertrophic spurring at C3-4 C4-5 C5-6 and C6-7 levels with disc osteophyte complexes at C3-4, C4-5, C5-6 and C6-7.  Postsurgical changes of discectomy with disc prostheses at C4-5 and C5-6.  No subluxation.            CONCLUSION:  Postsurgical changes of micro discectomy with disc prostheses C4-5 and C5-6 levels.  No subluxation.  No CT evidence to suggest acute cervical spine fracture.  No significant osseous encroachment of the central canal.  There is disc osteophyte complexes at C3-4, C4-5, C5-6 and C6-7 levels.  MRI of the cervical spine may be helpful for further evaluation if clinical concern persists.    LOCATION:  [id number]   Dictated by (CST): Peri Koo MD on 3/12/2025 at 1:05 PM     Finalized by (CST): Peri Koo MD on 3/12/2025 at 1:10 PM          Assessment/Plan:     50 year old female with PMH sig for C4-C6 Anterior Cervical Discectomy by Dr. York on 3/12 complicated by delayed  recovery postop who was just discharged from [facility] on [facility]/19 returned later in the day after she started having fevers and AMS.    Sepsis POA 2/2 RLL Pneumonia from Chlamydia Pneumonia   - cont zoneto guzmanro  - ID following  - complete IVF  - supportive cares  - monitor for improvement  - hold off on CT neck fluid collection drainage, this is likely a postop phlegmon of sorts and we have another source for sepsis     S/p C4-6 CDR on 3/12  - ortho consulted  - prn analgesics    FEN: regular diet, PT/OT  Proph: SCDs, lovenox  Code status: Full code    Outpatient records or previous hospital records reviewed.   [facility] hospitalist to continue to follow patient while in house      Patrick Hurd MD  [facility]  Hospitalist  Message over [email or web address]  Pager: [phone]                 [1] No Known Allergies

## 2025-03-20 NOTE — ED QUICK NOTES
Orders for admission, patient is aware of plan and ready to go upstairs. Any questions, please call ED RN Jane at extension [phone] .     Patient Covid vaccination status: Fully vaccinated     COVID Test Ordered in ED: $$$$Respiratory Flu Expanded Panel + Covid-19$$$$    COVID Suspicion at Admission: N/A    Running Infusions:      Mental Status/LOC at time of transport: AOx4    Other pertinent information:   CIWA score: N/A   NIH score:  N/A

## 2025-03-20 NOTE — PLAN OF CARE
NURSING ADMISSION NOTE      Patient admitted via cart from ED. Oriented to room. Safety precautions initiated. Bed in lowest position. Call light within reach. Patient comfortable and vital signs stable. Hospitalist notified.

## 2025-03-20 NOTE — CONSULTS
[facility] Pharmacy Dosing Service      Initial Pharmacokinetic Consult for Vancomycin Dosing     Brigitte Ac is a 50 year old female who is being initiated on vancomycin therapy for pneumonia.  Pharmacy has been asked to dose vancomycin by Dr. Steward.  .  The initial treatment and monitoring approach will be steady state AUC strategy.        Weight and Temperature:    Wt Readings from Last 1 Encounters:   25 51.7 kg (114 lb)        Temp Readings from Last 1 Encounters:   25 97.6 °F (36.4 °C) (Oral)      Labs:   Recent Labs   Lab 25  1118 25  0048   CREATSERUM 0.70 0.87      Estimated Creatinine Clearance: 61.7 mL/min (based on SCr of 0.87 mg/dL).     Recent Labs   Lab 25  1118 25  0049   WBC 9.5 14.7*          The Pharmacokinetic Target is:     to 600 mg-h/L and trough <=15 mg/L    Renal Dosing Considerations:    None     Assessment/Plan:   Initial/Loading dose: Has received 1250 mg IV (25 mg/kg, capped at 2250 mg) x 1 loading dose.      Maintenance dose: Pharmacy will dose vancomycin at 750 mg IV every 12 hours    Monitorin) Plan for vancomycin peak and trough to be obtained at steady state    2) Pharmacy will order SCr as clinically indicated to assess renal function.    3) Pharmacy will monitor for toxicity and efficacy, adjust vancomycin dose and/or frequency, and order vancomycin levels as appropriate per the Pharmacy and Therapeutics Committee approved protocol until discontinuation of the medication.       We appreciate the opportunity to assist in the care of this patient.     Kayleigh Andujar, PharmD  3/20/2025  7:53 AM  [facility] Extension: [phone]

## 2025-03-21 VITALS
TEMPERATURE: 98 F | OXYGEN SATURATION: 90 % | DIASTOLIC BLOOD PRESSURE: 78 MMHG | HEART RATE: 99 BPM | WEIGHT: 114 LBS | SYSTOLIC BLOOD PRESSURE: 121 MMHG | RESPIRATION RATE: 16 BRPM | BODY MASS INDEX: 20 KG/M2

## 2025-03-21 LAB
ANION GAP SERPL CALC-SCNC: 8 MMOL/L (ref 0–18)
BASOPHILS # BLD AUTO: 0.06 X10(3) UL (ref 0–0.2)
BASOPHILS NFR BLD AUTO: 0.5 %
BUN BLD-MCNC: 7 MG/DL (ref 9–23)
CALCIUM BLD-MCNC: 9.2 MG/DL (ref 8.7–10.6)
CHLORIDE SERPL-SCNC: 105 MMOL/L (ref 98–112)
CO2 SERPL-SCNC: 26 MMOL/L (ref 21–32)
CREAT BLD-MCNC: 0.74 MG/DL
EGFRCR SERPLBLD CKD-EPI 2021: 97 ML/MIN/1.73M2 (ref 60–?)
EOSINOPHIL # BLD AUTO: 0.29 X10(3) UL (ref 0–0.7)
EOSINOPHIL NFR BLD AUTO: 2.4 %
ERYTHROCYTE [DISTWIDTH] IN BLOOD BY AUTOMATED COUNT: 12.1 %
GLUCOSE BLD-MCNC: 122 MG/DL (ref 70–99)
HCT VFR BLD AUTO: 35.9 %
HGB BLD-MCNC: 12.4 G/DL
IMM GRANULOCYTES # BLD AUTO: 0.07 X10(3) UL (ref 0–1)
IMM GRANULOCYTES NFR BLD: 0.6 %
LYMPHOCYTES # BLD AUTO: 2.28 X10(3) UL (ref 1–4)
LYMPHOCYTES NFR BLD AUTO: 18.7 %
MCH RBC QN AUTO: 30.6 PG (ref 26–34)
MCHC RBC AUTO-ENTMCNC: 34.5 G/DL (ref 31–37)
MCV RBC AUTO: 88.6 FL
MONOCYTES # BLD AUTO: 0.72 X10(3) UL (ref 0.1–1)
MONOCYTES NFR BLD AUTO: 5.9 %
NEUTROPHILS # BLD AUTO: 8.77 X10 (3) UL (ref 1.5–7.7)
NEUTROPHILS # BLD AUTO: 8.77 X10(3) UL (ref 1.5–7.7)
NEUTROPHILS NFR BLD AUTO: 71.9 %
OSMOLALITY SERPL CALC.SUM OF ELEC: 287 MOSM/KG (ref 275–295)
PLATELET # BLD AUTO: 354 10(3)UL (ref 150–450)
POTASSIUM SERPL-SCNC: 3.5 MMOL/L (ref 3.5–5.1)
RBC # BLD AUTO: 4.05 X10(6)UL
SODIUM SERPL-SCNC: 139 MMOL/L (ref 136–145)
WBC # BLD AUTO: 12.2 X10(3) UL (ref 4–11)

## 2025-03-21 PROCEDURE — 97162 PT EVAL MOD COMPLEX 30 MIN: CPT

## 2025-03-21 PROCEDURE — 97116 GAIT TRAINING THERAPY: CPT

## 2025-03-21 PROCEDURE — 94760 N-INVAS EAR/PLS OXIMETRY 1: CPT

## 2025-03-21 PROCEDURE — 97166 OT EVAL MOD COMPLEX 45 MIN: CPT

## 2025-03-21 PROCEDURE — 80048 BASIC METABOLIC PNL TOTAL CA: CPT | Performed by: HOSPITALIST

## 2025-03-21 PROCEDURE — 97535 SELF CARE MNGMENT TRAINING: CPT

## 2025-03-21 PROCEDURE — 85025 COMPLETE CBC W/AUTO DIFF WBC: CPT | Performed by: HOSPITALIST

## 2025-03-21 RX ORDER — OXYCODONE AND ACETAMINOPHEN 5; 325 MG/1; MG/1
1 TABLET ORAL EVERY 4 HOURS PRN
Status: DISCONTINUED | OUTPATIENT
Start: 2025-03-21 | End: 2025-03-21

## 2025-03-21 RX ORDER — AZITHROMYCIN 500 MG/1
500 TABLET, FILM COATED ORAL DAILY
Qty: 1 TABLET | Refills: 0 | Status: SHIPPED | OUTPATIENT
Start: 2025-03-22 | End: 2025-03-23

## 2025-03-21 RX ORDER — OXYCODONE AND ACETAMINOPHEN 5; 325 MG/1; MG/1
2 TABLET ORAL EVERY 4 HOURS
Status: DISCONTINUED | OUTPATIENT
Start: 2025-03-21 | End: 2025-03-21

## 2025-03-21 RX ORDER — OXYCODONE AND ACETAMINOPHEN 5; 325 MG/1; MG/1
2 TABLET ORAL EVERY 4 HOURS PRN
Status: DISCONTINUED | OUTPATIENT
Start: 2025-03-21 | End: 2025-03-21

## 2025-03-21 RX ORDER — OXYCODONE AND ACETAMINOPHEN 5; 325 MG/1; MG/1
1 TABLET ORAL EVERY 4 HOURS
Status: DISCONTINUED | OUTPATIENT
Start: 2025-03-21 | End: 2025-03-21

## 2025-03-21 NOTE — CM/SW NOTE
03/21/25 1000   CM/SW Referral Data   Referral Source Social Work (self-referral)   Reason for Referral Discharge planning   Informant EMR;Clinical Staff Member   Patient Info   Patient's Current Mental Status at Time of Assessment Alert;Oriented   Patient lives with Spouse/Significant other   Discharge Needs   Anticipated D/C needs Acute rehab;Transportation services   Services Requested   PMR Consult Requested Consult ordered       Patient is a 51 y/o woman admitted with pneumonia.  Pt is familiar to me from her recent hospital admission s/p outpatient C4-C5 and C5-C6 discectomy.  Pt was discharged to  acute rehab on 3/19 and readmitted the next day.      Spoke with Barb from [facility] who stated that pt can be accepted for readmission pending new insurance auth.  PMR consult order placed.  PT/OT worked with pt today and confirmed pt would benefit from intensive rehab programs at discharge.    Met with pt and her  who confirmed preference for return to  at [place]   to request insurance auth.  Anticipate discharge once auth received.  / to remain available for support and/or discharge planning.     Mallory Starkey, [facility]  Discharge Planner  [phone]

## 2025-03-21 NOTE — CONSULTS
PHYSICAL MEDICINE AND REHABILITATION CONSULTATION       Location [facility] 3SW-A Attending Jose Hurd*   Hosp Day # 1 PCP Hu Orozco DO     Patient Identification  Brigitte Ac is a 50 year old female.  :  1975  Admit Date:  3/20/2025  Attending Provider:  Jose Hurd*                                  Primary Care Physician:  Hu Orozco DO   Admitting Diagnosis: Metabolic encephalopathy [G93.41]  Pneumonia of right lower lobe due to Chlamydia species [J16.0]  Leukocytosis, unspecified type [D72.829]  Severe sepsis (HCC) [A41.9, R65.20]    CC: Impaired mobility and ADL dysfunction secondary to Pneumonia of right lower lobe due to Chlamydia species         HPI:  Patient is a 50 year old female with PMH sig for C4-C6 Anterior Cervical Discectomy by Dr. York on 3/12 and was discharged from [facility] to [facility] on 3/19 returned later in the day after she started having fevers to 103 and AMS. Started on IV abx for Chlamydia PNA.    PM&R has now been consulted in order to assess patient's functional status and make recommendations for rehabilitation plan of care. Pain is controlled. Spent extensive time discussing the patient's daughter's high school dance tomorrow, and that we would not be able to accommodate her leaving under special circumstances for the occasion, as she will be doing therapy evaluations all day and we would not be able to guarantee the safety of such a venture. She states that she understands. She understands that she is weak and has foot drop and does with to go the acute rehab. She is essentially min assist. She is toileting and having bowel movements.     ROS:  All other systems were reviewed and are negative except as noted under HPI    Current medications: Full medication list has been personally reviewed and include:   oxyCODONE-acetaminophen (Percocet) 5-325 MG per tab 1 tablet  1 tablet Oral Q4H PRN    Or    oxyCODONE-acetaminophen (Percocet)  5-325 MG per tab 2 tablet  2 tablet Oral Q4H PRN    [COMPLETED] acetaminophen (Tylenol Extra Strength) tab 1,000 mg  1,000 mg Oral Once    [COMPLETED] lactated ringers IV bolus 1,551 mL  30 mL/kg Intravenous Once    [COMPLETED] vancomycin (Vancocin) 1.25 g in sodium chloride 0.9% 250mL IVPB premix  25 mg/kg Intravenous Once    [COMPLETED] piperacillin-tazobactam (Zosyn) 4.5 g in dextrose 5% 100 mL IVPB-ADDV  4.5 g Intravenous Once    lactated ringers IV bolus 1,000 mL  1,000 mL Intravenous Once    [COMPLETED] iopamidol 76% (ISOVUE-370) injection for power injector  50 mL Intravenous ONCE PRN    [COMPLETED] azithromycin (Zithromax) 500 mg in sodium chloride 0.9% 250mL IVPB premix  500 mg Intravenous Once    melatonin tab 3 mg  3 mg Oral Nightly PRN    ondansetron (Zofran) 4 MG/2ML injection 4 mg  4 mg Intravenous Q6H PRN    prochlorperazine (Compazine) 10 MG/2ML injection 5 mg  5 mg Intravenous Q8H PRN    polyethylene glycol (PEG 3350) (Miralax) 17 g oral packet 17 g  17 g Oral Daily PRN    sennosides (Senokot) tab 17.2 mg  17.2 mg Oral Nightly PRN    bisacodyl (Dulcolax) 10 MG rectal suppository 10 mg  10 mg Rectal Daily PRN    fleet enema (Fleet) rectal enema 133 mL  1 enema Rectal Once PRN    acetaminophen (Tylenol Extra Strength) tab 1,000 mg  1,000 mg Oral Q8H PRN    cyclobenzaprine (Flexeril) tab 10 mg  10 mg Oral TID PRN    diazePAM (Valium) tab 5 mg  5 mg Oral Q6H PRN    enoxaparin (Lovenox) 40 MG/0.4ML SUBQ injection 40 mg  40 mg Subcutaneous Daily    azithromycin (Zithromax) tab 500 mg  500 mg Oral Daily       Allergies:  Allergies[1]    Past Medical History:  Past Medical History:    Hospitalism    none       Past Surgical History:  History reviewed. No pertinent surgical history.    Family History:   Family History   Problem Relation Age of Onset    Hypertension Father     Diabetes Mother     Heart Disorder Mother 50        CABG    Cancer Maternal Grandmother         breast         Social History:  Social  History     Socioeconomic History    Marital status:     Number of children: 2   Occupational History    Occupation: Sales   Tobacco Use    Smoking status: Never    Smokeless tobacco: Never   Vaping Use    Vaping status: Never Used   Substance and Sexual Activity    Alcohol use: Yes     Alcohol/week: 0.0 standard drinks of alcohol     Comment: socially    Drug use: No    Sexual activity: Yes     Partners: Male       FUNCTIONAL STATUS:  Premorbid functional status/Living Situation-       Current functional Status:   FUNCTIONAL TRANSFER ASSESSMENT  Sit to Stand: Edge of Bed; Chair  Edge of Bed: Contact Guard Assist  Chair: Contact Guard Assist  Toilet Transfer: Contact Guard Assist     BED MOBILITY  Supine to Sit : Stand-by Assist  Scooting: SBA     BALANCE ASSESSMENT  Static Sitting: Independent  Static Standing: Contact Guard Assist     FUNCTIONAL ADL ASSESSMENT  Grooming Standing: Minimal Assist  UB Dressing Seated: Minimal Assist  LB Dressing Seated: Minimal Assist  LB Dressing Standing: Contact Guard Assist     ACTIVITY TOLERANCE: Pt on room air and denies SOB, dizziness or lightheadedness throughout session. No significant change in vitals noted.      COGNITION  Arousal/Alertness:  appropriate responses to stimuli  Orientation Level:  oriented x4  Following Commands:  follows all commands and directions without difficulty  Pt w/ slightly delayed processing      OBJECTIVE:    /77 (BP Location: Left arm)   Pulse 95   Temp 98.3 °F (36.8 °C) (Oral)   Resp 16   Wt 114 lb (51.7 kg)   LMP 01/22/2025 (Approximate)   SpO2 91%   BMI 19.57 kg/m²   Body mass index is 19.57 kg/m².   General: well nourished, NAD  Eyes: conjunctiva and lids intact, pupils are equal and round  ENMT: external inspection of ears and nose within normal limits. Normal functional hearing  Neck: supple, symmetrical, no thyromegaly appreciated. Anterior incisional site with steristrips and is C/D/I  Lymph: no cervical and no  axillary lymphadenopathy  Lungs: Non labored on RA, no wheezing appreciated, No accessory muscle noted  Heart:  no edema noted in BLE  Abdomen: soft, non-tender, non-distended. No hepatomegaly appreciated.  Extrems: no clubbing/cyanosis noted in digits or nails  Psych: awake,alert, oriented; normal mood and affect  MSK: PROM of BUE full; MMT 4-/5 bilateral UE. 35/ for bilateral hip flexors. no dislocation noted BUE  Neuro: CN 2-12 grossly intact, sensation intact to LT in BUE/BLE;  speech clear and fluent    Data Review:    Lab Results   Component Value Date    WBC 12.2 03/21/2025    HGB 12.4 03/21/2025    HCT 35.9 03/21/2025    .0 03/21/2025    CREATSERUM 0.74 03/21/2025    BUN 7 03/21/2025     03/21/2025    K 3.5 03/21/2025     03/21/2025    CO2 26.0 03/21/2025     03/21/2025    CA 9.2 03/21/2025       No results found.    ASSESSMENT:    Deficits of self care and mobility secondary to   Principal Problem:    Pneumonia of right lower lobe due to Chlamydia species  Active Problems:    Fever    Severe sepsis (HCC)    Leukocytosis, unspecified type    Metabolic encephalopathy     Mild processing delays    Recommendations: acute rehab    This patient meets medical and rehab criteria to qualify for acute inpatient rehab:  This patient will require an intensive and coordinated interdisciplinary team approach  Patient requires and shall be able to participate in 3 hours of therapy 5 days a week.  The patient is reasonably expected to actively participate in and benefit significantly from the intensive rehabilitation therapy program within a prescribed period of time of 14 days, with goal of discharge to community.   This patient requires active and ongoing intervention of multiple therapy disciplines including PT, OT, speech-language pathology, therapeutic recreation, rehab psychology, rehab case management, with weekly interdisciplinary team meetings to address barriers to progress and revise  treatment plan as needed.  This patient will require require physician supervision by rehabilitation physician with face-to-face visit at least 3 days a week to assess the patient both medically and functionally.  Anticipated goals are to improve from min assist to mod I level to facilitate safe community discharge.  24 hr rehab nursing also beneficial for medication management, pressure sore prevention, bowel and bladder management, skin management.   DVT ppx: lovenox      Advanced directives reviewed and in chart.     Thank you for allowing me to participate in the care of this patient.     Lisandra Agosto MD, FAAPM&R          [1] No Known Allergies

## 2025-03-21 NOTE — PAYOR COMM NOTE
Discharge Notification    Patient Name: UVALDO ANDERSEN  Payor: [employer]  Subscriber #: [id number]  Authorization Number: [id number]  Admit Date/Time: 3/12/2025 11:42 AM  Discharge Date/Time: 3/19/2025 6:10 PM

## 2025-03-21 NOTE — PLAN OF CARE
Patient A&Ox4, room air, and ambulating with walker. PO Valium q6 PRN.  Plan of care, fall precautions, and pain management reviewed with patient.

## 2025-03-21 NOTE — OCCUPATIONAL THERAPY NOTE
OCCUPATIONAL THERAPY NEURO EVALUATION - INPATIENT     Room Number: 386/386-A  Evaluation Date: 3/21/2025  Type of Evaluation: Initial  Presenting Problem: Sepsis POA 2/2 RLL Pneumonia from Chlamydia Pneumonia    Physician Order: IP Consult to Occupational Therapy  Reason for Therapy: ADL/IADL Dysfunction and Discharge Planning    OCCUPATIONAL THERAPY ASSESSMENT   Patient is currently functioning below baseline with toileting, bathing, upper body dressing, lower body dressing, grooming, transfers, static standing balance, dynamic standing balance, and functional standing tolerance. Prior to admission, patient's baseline is independent.  Patient is requiring SBA to MIN A as a result of the following impairments: decreased functional strength, decreased functional reach, decreased endurance, pain, impaired standing balance, impaired coordination, impaired motor planning, decreased muscular endurance, limited BUE ROM, and decreased sensation. Occupational Therapy will continue to follow for duration of hospitalization.    Patient will benefit from continued skilled OT Services to facilitate return to prior level of function as patient demonstrates high motivation with excellent tolerance to an intensive therapy program    History Related to Current Admission: Patient is a 50 year old female admitted on 3/20/2025 from [facility] with fever and chills. Pt diagnosed with Sepsis POA 2/2 RLL Pneumonia from Chlamydia Pneumonia.    Recent Admissions:  3/12-3/19/25:UE/LE weakness --> Dc'd to [facility]    Co-Morbidities : C4-C6 Anterior Cervical Discectomy    Recommendations for nursing staff:   Transfers: x1 with RW  Toileting location: toilet    EVALUATION SESSION:  Patient Start of Session: semi-supine    FUNCTIONAL TRANSFER ASSESSMENT  Sit to Stand: Edge of Bed; Chair  Edge of Bed: Contact Guard Assist  Chair: Contact Guard Assist  Toilet Transfer: Contact Guard Assist    BED MOBILITY  Supine to Sit : Stand-by Assist  Scooting:  SBA    BALANCE ASSESSMENT  Static Sitting: Independent  Static Standing: Contact Guard Assist    FUNCTIONAL ADL ASSESSMENT  Grooming Standing: Minimal Assist  UB Dressing Seated: Minimal Assist  LB Dressing Seated: Minimal Assist  LB Dressing Standing: Contact Guard Assist    ACTIVITY TOLERANCE: Pt on room air and denies SOB, dizziness or lightheadedness throughout session. No significant change in vitals noted.     COGNITION  Arousal/Alertness:  appropriate responses to stimuli  Orientation Level:  oriented x4  Following Commands:  follows all commands and directions without difficulty  Pt w/ slightly delayed processing    VISION  Current Vision: no visual deficits    PERCEPTION  Overall Perception Status:   WFL - within functional limits    Communication: Pt able to communicate basic wants and needs without difficulty.    Behavioral/Emotional/Social: Pt is pleasant and agreeable to therapy.    UPPER EXTREMITY  ROM: improved since initial eval, distal AROM WFL, shoulder still limited to ~90 degrees flexion  Strength: impaired bilaterally   Coordination  Gross motor: impaired  Fine motor:  impaired   Sensation:  reports numbness and mild parasthesias to scott hands     Shoulder flexion: 3/5  Elbow flexion: 3+/5  Forearm supination: 3+/5  Wrist flexion: 3+/5  Digit flexion: 3/5  Shoulder joint integrity: intact     Neurological findings:  Neurological Findings: Coordination - Finger to Nose, Coordination - Rapid Alternating Movement, Coordination - Finger Opposition  Coordination - Finger to Nose: Left decreased speed, Right decreased speed  Coordination - Rapid Alternating Movement: Left decreased speed, Right decreased speed  Coordination - Finger Opposition: Left decreased speed, Right decreased speed       EDUCATION PROVIDED  Patient Education : Role of Occupational Therapy; Plan of Care; Discharge Recommendations  Patient's Response to Education: Verbalized Understanding  Family/Caregiver Education : Role of  Occupational Therapy; Plan of Care  Family/Caregiver's Response to Education: Verbalized Understanding    Equipment used: RW  Demonstrates functional use, Would benefit from additional trial      Therapist comments: Pt tolerates standing at sink for extended grooming routine with MIN A needed for hair care. Full body dressing performed with significantly inc'd time and MIN A.     Patient End of Session: Up in chair, With  staff, Needs met, Call light within reach, RN aware of session/findings, All patient questions and concerns addressed, Hospital anti-slip socks    OCCUPATIONAL PROFILE    HOME SITUATION  Type of Home: House  Home Layout: Two level, Able to live on main level  Lives With: Spouse, Family (13 and 18 year old)  Toilet and Equipment: Standard height toilet  Other Equipment: None  Occupation/Status: works on the board for several non-profits, formeraly in [place]  Hand Dominance: Right  Drives: Yes  Patient Regularly Uses: None    Prior Level of Function: Pt is typically independent with all aspects of mobility and self-cares without device.    SUBJECTIVE   \"Is the pneumonia why I feel weaker?\" Pt reports feeling inc'd weakness compared to a few days ago.     PAIN ASSESSMENT  Ratin  Location: neck  Management Techniques: Activity promotion, Body mechanics, Breathing techniques, Repositioning    OBJECTIVE  Precautions: Bed/chair alarm, Spine  Fall Risk: Standard fall risk    ASSESSMENTS  AM-PAC ‘6-Clicks’ Inpatient Daily Activity Short Form  -   Putting on and taking off regular lower body clothing?: A Little  -   Bathing (including washing, rinsing, drying)?: A Little  -   Toileting, which includes using toilet, bedpan or urinal? : A Little  -   Putting on and taking off regular upper body clothing?: A Little  -   Taking care of personal grooming such as brushing teeth?: A Little  -   Eating meals?: None    AM-PAC Score:  Score: 19  Approx Degree of Impairment: 42.8%  Standardized Score (AM-PAC Scale):  40.22    ADDITIONAL TESTS     NEUROLOGICAL FINDINGS  Coordination - Finger to Nose: Left decreased speed; Right decreased speed  Coordination - Rapid Alternating Movement: Left decreased speed; Right decreased speed  Coordination - Finger Opposition: Left decreased speed; Right decreased speed     PLAN  OT Device Recommendations: TBD  OT Treatment Plan: Balance activities, ADL training, Energy conservation/work simplification techniques, Functional transfer training, UE strengthening/ROM, Endurance training, Patient/Family education, Patient/Family training, Equipment eval/education, Neuromuscluar reeducation, Fine motor coordination activities, Compensatory technique education  Rehab Potential : Good  Frequency: 3-5x/week  Number of Visits to Meet Established Goals: 5    ADL Goals   Patient will perform grooming: with stand by assist and while standing at sink  Patient will perform upper body dressing:  with setup  Patient will perform lower body dressing:  with setup  Patient will perform toileting: with stand by assist    Functional Transfer Goals  Patient will transfer from sit to stand:  with stand by assist  Patient will transfer to toilet:  with supervision    UE Exercise Program Goal  Patient will be supervision with bilateral AROM and coordination HEP (home exercise program).    Patient Evaluation Complexity Level:   Occupational Profile/Medical History MODERATE - Expanded review of history including review of medical or therapy record   Specific performance deficits impacting engagement in ADL/IADL MODERATE  3 - 5 performance deficits   Client Assessment/Performance Deficits MODERATE - Comorbidities and min to mod modifications of tasks    Clinical Decision Making MODERATE - Analysis of occupational profile, detailed assessments, several treatment options    Overall Complexity MODERATE     OT Session Time: 40 minutes  Self-Care Home Management: 30 minutes

## 2025-03-21 NOTE — DISCHARGE SUMMARY
[facility] Hospitalist Discharge Summary     Patient ID:  Brigitte Ac  50 year old  2/18/1975    Admit date: 3/20/2025    Discharge date and time: 03/21/25     Attending Physician: Jose Hurd*     Primary Care Physician: Hu Orozco DO     Discharge Diagnoses: Metabolic encephalopathy [G93.41]  Pneumonia of right lower lobe due to Chlamydia species [J16.0]  Leukocytosis, unspecified type [D72.829]  Severe sepsis (HCC) [A41.9, R65.20]    Please note that only [facility] DMG and EMG patients enrolled in the Medicare ACO, BCBS ACO and [facility] HMOs will be handled by the [facility] Care Management team.  For all other patients, please follow usual protocol for discharge care transition.    Discharge Condition: stable    Disposition:  IPR    Important Follow up:  - PCP within 2 weeks              Hospital Course:        50 year old female with PMH sig for C4-C6 Anterior Cervical Discectomy by Dr. York on 3/12 complicated by delayed recovery postop who was just discharged from EDW to [facility] on 3/19 returned later in the day after she started having fevers and AMS. She was fairly altered and was a poor historian in the ED.   In the ED VSS. Labs with leukocytosis, CXR with RLL opacity and her RVP was positive for chlamydia pneumoniae. CT soft tissue neck with expect postsurgical changes however abscess could not be ruled out. Started on BS abx with zosyn, vanc azithro and admitted for further evaluation and treatment.     Sepsis POA 2/2 RLL Pneumonia from Chlamydia Pneumonia   - cont zosyn, azithro - dc on azithromycin, needs 3 days course, EOT 3/22 - d/w ID  - ID following  - completed IVF  - supportive cares  - monitor for improvement  - hold off on CT neck fluid collection drainage, this is likely a postop phlegmon of sorts and we have another clear source for sepsis      S/p C4-6 CDR on 3/12  - ortho consulted  - prn analgesics       Consults: IP CONSULT  TO ORTHOPEDIC SURGERY  IP CONSULT TO PHARMACY  IP CONSULT TO INFECTIOUS DISEASE    Operative Procedures:        Patient instructions:      I as the attending physician reconciled the current and discharge medications on day of discharge.     Current Discharge Medication List        START taking these medications    Details   azithromycin 500 MG Oral Tab Take 1 tablet (500 mg total) by mouth daily for 1 day.           CONTINUE these medications which have NOT CHANGED    Details   !! diazePAM 5 MG Oral Tab Take 1 tablet (5 mg total) by mouth every 6 (six) hours as needed for Anxiety (muscle spasms). Take dose 45 minutes prior to MRI      HYDROcodone-acetaminophen  MG Oral Tab Take 1 tablet by mouth 3 (three) times daily as needed for Pain.      oxyCODONE-acetaminophen  MG Oral Tab Take 1 tablet by mouth every 6 (six) hours as needed for Pain.      Naloxone HCl 4 MG/0.1ML Nasal Liquid 4 mg by Nasal route as needed. If patient remains unresponsive, repeat dose in other nostril 2-5 minutes after first dose.      !! diazePAM 5 MG Oral Tab Take 1 tablet (5 mg total) by mouth every 6 (six) hours as needed for Anxiety. Take dose 45 minutes prior to MRI      cyclobenzaprine 10 MG Oral Tab Take 1 tablet (10 mg total) by mouth 3 (three) times daily as needed.      acetaminophen 500 MG Oral Tab Take 2 tablets (1,000 mg total) by mouth every 8 (eight) hours as needed for Pain.      Multiple Vitamins-Minerals (MULTIVITAMIN WOMEN OR) Take 1 tablet by mouth daily.       !! - Potential duplicate medications found. Please discuss with provider.          Activity: activity as tolerated  Diet: regular diet  Wound Care: as directed  Code Status: No Order      Discharge Exam:     General: no acute distress, alert and oriented x 3  Heart: RRR  Lungs: clear bilaterally, no active wheezing  Abdomen: nontender, nondistended, intact BS  Extremities: no pedal edema   Neuro: CN inact, no focal deficits      Total time coordinating  care for discharge: Greater than 30 minutes    Patrick Hurd MD  [facility]

## 2025-03-21 NOTE — PROGRESS NOTES
Infectious Disease Progress Note      Date of admission: 3/20/2025 12:37 AM     Reason for consult: Chlamydia pneumoniae pneumonia    Referring physician: Jose Hurd*    Subjective: Patient feels overall weak; however, respiratory symptoms improving.  Neck pain is about the same.    The rest of the systems were reviewed and found to be negative except was mentioned above    Medications:    oxyCODONE-acetaminophen **OR** oxyCODONE-acetaminophen    lactated ringers    melatonin    ondansetron    prochlorperazine    polyethylene glycol (PEG 3350)    sennosides    bisacodyl    fleet enema    acetaminophen    cyclobenzaprine    diazePAM    enoxaparin    azithromycin     Allergies:  Allergies[1]    Physical Exam:  Vitals:    03/21/25 0730   BP: 117/83   Pulse: 105   Resp: 16   Temp: 99 °F (37.2 °C)     Vitals signs and nursing note reviewed.   Constitutional:       Appearance: Normal appearance.   HENT:      Head: Normocephalic and atraumatic.      Mouth: Mucous membranes are moist.   Neck:      Musculoskeletal: Neck supple.  Surgical incision noted without any signs of infection  Cardiovascular:      Rate and Rhythm: Normal rate.      Heart sounds: Normal heart sounds. No murmur. No friction rub. No gallop.    Pulmonary:      Effort: Pulmonary effort is normal. No respiratory distress.      Breath sounds: Normal breath sounds. No stridor. No wheezing, rhonchi or rales.   Skin:     General: Skin is warm and dry.   Neurological:      General: No focal deficit present.      Mental Status: Alert and oriented to person, place, and time.       Laboratory data:  I have reviewed all the lab results independently.  Lab Results   Component Value Date    WBC 12.2 03/21/2025    HGB 12.4 03/21/2025    HCT 35.9 03/21/2025    .0 03/21/2025    CREATSERUM 0.74 03/21/2025    BUN 7 03/21/2025     03/21/2025    K 3.5 03/21/2025     03/21/2025    CO2 26.0 03/21/2025     03/21/2025    CA 9.2  ----- Message from Ivy Jeronimo RN sent at 5/25/2023 10:23 AM CDT -----  Call on Tuesday to check status of rash     03/21/2025      Recent Labs   Lab 03/21/25  0443   RBC 4.05   HGB 12.4   HCT 35.9   MCV 88.6   MCH 30.6   MCHC 34.5   RDW 12.1   NEPRELIM 8.77*   WBC 12.2*   .0      Microbiology data:  Hospital Encounter on 03/20/25   1. Urine Culture, Routine     Status: None    Collection Time: 03/20/25  2:16 AM    Specimen: Urine, clean catch   Result Value Ref Range    Urine Culture No Growth at 18-24 hrs. N/A   2. Blood Culture     Status: None (Preliminary result)    Collection Time: 03/20/25 12:48 AM    Specimen: Blood,peripheral   Result Value Ref Range    Blood Culture Result No Growth 1 Day N/A        Radiology:  I have reviewed all imagining data available independently.   Chest x-ray:  Right lower lobe pneumonia     CT soft tissue neck:  Postoperative changes from a right anterior approach C4-C5 and C5-C6 discectomy.  Fluid tracks along the incision site and at the prevertebral soft tissue extending inferiorly to the level of the thyroid gland with a thin margin of peripheral   enhancement.  While these findings could represent postsurgical fluid (such as a seroma or hematoma) and granulation tissue, superimposed infection with early abscess formation remains a differential consideration.  No air is seen within this collection   to suggest a mature abscess.  Neurosurgical consultation is suggested.  Fluid aspiration could be performed for further assessment as clinically appropriate.      This fluid collection measures up to 5.9 x 3 cm in the axial plane.  This collection measures 5.4 cm in the CC dimension.     Impression:  Brigitte Ac is a 50 year old female with    Chlamydia pneumoniae pneumonia presenting here with high fever and leukocytosis, concerning for evolving sepsis with threat to life  Chest x-ray with right lower lobe pneumonia, most likely causing high fever and leukocytosis  CT soft tissue neck noted  Currently on azithromycin, day 2 of 3  Recent C-spine discectomy  Surgical incision noted  without any signs of infection  CT is representing most likely post surgical changes    Recommendations:    Discharge patient on 1 more day of azithromycin 500 mg to be taken tomorrow  ID will sign off, please call us with any questions or changes of status.  Thank you for this consultation.    The plan of care was discussed with the primary hospital team, Jose Hurd*     Recommendations were also discussed with the patient; all questions were answered.     Thank you for this consultation. Please don't hesitate to call the ID team for questions or any acute changes in patient's clinical condition.    Please note that this report has been produced using speech recognition software and may contain errors related to that system including, but not limited to, errors in grammar, punctuation, and spelling, as well as words and phrases that possibly may have been recognized inappropriately.  If there are any questions or concerns, contact the dictating provider for clarification.    The  Cent[employer] Cures Act makes medical notes like these available to patients in the interest of transparency. Please be advised this is a medical document. Medical documents are intended to carry relevant information, facts as evident, and the clinical opinion of the practitioner. The medical note is intended as peer to peer communication and may appear blunt or direct. It is written in medical language and may contain abbreviations or verbiage that are unfamiliar.     Claudette Lambert MD  [facility]. Tel: [phone]. Fax: [phone].     Brigitte Ac : 1975 MRN: [id number] [facility]: [id number]          [1] No Known Allergies

## 2025-03-21 NOTE — PHYSICAL THERAPY NOTE
PHYSICAL THERAPY EVALUATION - INPATIENT     Room Number: 386/386-A  Evaluation Date: 3/21/2025  Type of Evaluation: Initial  Physician Order: PT Eval and Treat    Presenting Problem: fever, chills > pneumonia  Co-Morbidities : C4-C6 Anterior Cervical Discectomy  Reason for Therapy: Mobility Dysfunction and Discharge Planning    Recent Admissions:  3/12-3/19/25:UE/LE weakness> INT  PHYSICAL THERAPY ASSESSMENT   Patient is a 50 year old female admitted 3/20/2025 for fever,chills, AMS.  Prior to admission, patient's baseline is ind.  Patient is currently functioning below baseline with bed mobility, transfers, gait, stair negotiation, and standing prolonged periods.  Patient is requiring minimal assist as a result of the following impairments: decreased functional strength, decreased endurance/aerobic capacity, pain, impaired standing balance, impaired coordination, impaired motor planning, and decreased muscular endurance.  Physical Therapy will continue to follow for duration of hospitalization.    Patient will benefit from continued skilled PT Services to facilitate return to prior level of function as patient demonstrates high motivation with excellent tolerance to an intensive therapy program .    PLAN DURING HOSPITALIZATION  Nursing Mobility Recommendation : 1 Assist  PT Device Recommendation: Rolling walker  PT Treatment Plan: Bed mobility, Body mechanics, Coordination, Endurance, Energy conservation, Patient education, Family education, Gait training, Neuromuscular re-educate, Range of motion, Strengthening, Stoop training, Stair training, Transfer training, Balance training  Rehab Potential : Good  Frequency (Obs): 5x/week     CURRENT GOALS    Goal #1 Patient is able to demonstrate supine - sit EOB @ level: independent     Goal #2 Patient is able to demonstrate transfers Sit to/from Stand at assistance level: independent     Goal #3 Patient is able to ambulate 100 feet with assist device: none at assistance  level: supervision     Goal #4 Patient is able to ascend/descend 2 steps at supervision   Goal #5    Goal #6    Goal Comments: Goals established on 3/21/2025      PHYSICAL THERAPY MEDICAL/SOCIAL HISTORY  History related to current admission: Patient is a 50 year old female admitted on 3/20/2025 from [facility] for AMS, chills, fever.  Pt diagnosed with pneumonia.    HOME SITUATION  Type of Home: House  Home Layout: Two level, Able to live on main level  Stairs to Enter : 2   Railing: No              Lives With: Spouse, Family (13 and 18 year old)    Drives: Yes   Patient Regularly Uses: None      Prior Level of [place]:   Per pt - resides in two story home with spouse and two teenage children. Ambulates indep. No history of falls.    SUBJECTIVE  \"It feels good to have clothes on!\"    OBJECTIVE  Precautions: Bed/chair alarm, Spine  Fall Risk: Standard fall risk    WEIGHT BEARING RESTRICTION     PAIN ASSESSMENT  Ratin  Location: neck  Management Techniques: Activity promotion, Body mechanics, Repositioning    COGNITION  Overall Cognitive Status:  WFL - within functional limits    RANGE OF MOTION AND STRENGTH ASSESSMENT  Upper extremity ROM and strength -- see OT note for specifics  Lower extremity ROM is within functional limits   Lower extremity strength is within functional limits     BALANCE  Static Sitting: Fair  Dynamic Sitting: Fair -  Static Standing: Poor +  Dynamic Standing: Poor    ADDITIONAL TESTS                                    ACTIVITY TOLERANCE                         O2 WALK       NEUROLOGICAL FINDINGS                        AM-PAC '6-Clicks' INPATIENT SHORT FORM - BASIC MOBILITY  How much difficulty does the patient currently have...  Patient Difficulty: Turning over in bed (including adjusting bedclothes, sheets and blankets)?: A Little   Patient Difficulty: Sitting down on and standing up from a chair with arms (e.g., wheelchair, bedside commode, etc.): A Little   Patient Difficulty:  Moving from lying on back to sitting on the side of the bed?: A Little   How much help from another person does the patient currently need...   Help from Another: Moving to and from a bed to a chair (including a wheelchair)?: A Little   Help from Another: Need to walk in hospital room?: A Little   Help from Another: Climbing 3-5 steps with a railing?: A Lot     AM-PAC Score:  Raw Score: 17   Approx Degree of Impairment: 50.57%   Standardized Score (AM-PAC Scale): 42.13   CMS Modifier (G-Code): CK    FUNCTIONAL ABILITY STATUS  Gait Assessment   Functional Mobility/Gait Assessment  Gait Assistance: Minimum assistance  Distance (ft): 50  Assistive Device: Rolling walker  Pattern: L Steppage, R Steppage    Skilled Therapy Provided   Educated on spine precautions- no bending, lifting, twisting  Educated on importance of continued out of bed mobility with assistance from nursing staff and use of RW    Pt received getting dressed with OT> sit to stand from bedside chair to RW> ambulated 50 feet with RW> upright in chair at end of session    *pt reports weakness and numbness/tingling to BUE (mainly forearms/hands)    Bed Mobility:  Rolling: NT  Supine to sit: NT   Sit to supine: nt     Transfer Mobility:  Sit to stand: E[place] to RW- vc for hand placement   Stand to sit: Ector for eccentric control  Gait = Ector with RW x 50 feet- BL steppage gait, wide base of support, lateral swaying. Two lateral losses of balance outside boundaries of RW     Therapist's Comments:   RN gave clearance to see patient. Discussed role and goal of physical therapy in hospital setting. Spouse present at beginning of session. Pt in agreement to session.     Exercise/Education Provided:  Body mechanics  Energy conservation  Functional activity tolerated  Gait training  Posture  Transfer training    Patient End of Session: Up in chair, Needs met, Call light within reach, RN aware of session/findings, All patient questions and concerns addressed,  Hospital anti-slip socks, Alarm set, SCDs in place, Discussed recommendations with /      Patient Evaluation Complexity Level:  History Moderate - 1 or 2 personal factors and/or co-morbidities   Examination of body systems Moderate - addressing a total of 3 or more elements   Clinical Presentation  Moderate - Evolving   Clinical Decision Making Moderate Complexity     PT Session Time: 25 minutes  Gait Training: 10 minutes

## 2025-03-21 NOTE — CM/SW NOTE
Informed by Barb with [facility] that they have received insurance auth and can accept pt for admission today.  She will go to room 3309. RN to call report to [phone].     Met with pt/spouse at bedside to update them as above. Pt/family agreeable with DC today. Pt's  to provide transportation. Encouraged pt to eat dinner and DC around 5:30pm.     Updated pt's RN. / to remain available for support and/or discharge planning.     Mallory Starkey, [facility]  Discharge Planner  [phone]

## 2025-03-21 NOTE — PLAN OF CARE
Maria Guadalupe improved tonight, oriented x4. RA, 2L O2 nc PRN at night. . Pain moderately controlled, pt reports that she does not think the PO norco is adequate for her pain relief. PO valium q6 PRN. Up in chair tonight, up with min-mod assist with gb and walker. WBAT. Weakness to BUE, moderate intermittent N/T to bilat hands. IV abx infusing as ordered. Voids freely to bathroom. Declines SCDs. Ant neck incision with steri strips C/D/I. Dc back to  when cleared. Reviewed POC, pain management, IS use, and fall precautions with pt. Bed alarm on w/bed in lowest position. Pt reminded to use call light.

## 2025-03-24 NOTE — PROGRESS NOTES
Physician Clarification    Additional information related to the patient's Altered Mental Status    Acute metabolic encephalopathy     This note is part of the patient's medical record.

## 2025-03-31 ENCOUNTER — ORDER TRANSCRIPTION (OUTPATIENT)
Dept: PHYSICAL THERAPY | Facility: HOSPITAL | Age: 50
End: 2025-03-31

## 2025-03-31 DIAGNOSIS — M47.812 CERVICAL SPONDYLOSIS: Primary | ICD-10-CM

## 2025-03-31 DIAGNOSIS — Z98.1 S/P CERVICAL SPINAL FUSION: ICD-10-CM

## 2025-03-31 DIAGNOSIS — Z74.09 IMPAIRED MOBILITY: ICD-10-CM

## 2025-04-07 ENCOUNTER — ORDER TRANSCRIPTION (OUTPATIENT)
Dept: PHYSICAL THERAPY | Facility: HOSPITAL | Age: 50
End: 2025-04-07

## 2025-04-07 DIAGNOSIS — Z74.09 IMPAIRED MOBILITY: ICD-10-CM

## 2025-04-07 DIAGNOSIS — Z98.1 S/P CERVICAL SPINAL FUSION: ICD-10-CM

## 2025-04-07 DIAGNOSIS — M47.812 CERVICAL SPONDYLOSIS: Primary | ICD-10-CM

## 2025-04-10 ENCOUNTER — TELEPHONE (OUTPATIENT)
Dept: PHYSICAL THERAPY | Facility: HOSPITAL | Age: 50
End: 2025-04-10

## 2025-04-11 ENCOUNTER — APPOINTMENT (OUTPATIENT)
Dept: SPEECH THERAPY | Facility: HOSPITAL | Age: 50
End: 2025-04-11
Attending: PHYSICAL MEDICINE & REHABILITATION
Payer: COMMERCIAL

## 2025-04-14 ENCOUNTER — APPOINTMENT (OUTPATIENT)
Dept: SPEECH THERAPY | Facility: HOSPITAL | Age: 50
End: 2025-04-14
Attending: PHYSICAL MEDICINE & REHABILITATION
Payer: COMMERCIAL

## 2025-04-18 ENCOUNTER — APPOINTMENT (OUTPATIENT)
Dept: SPEECH THERAPY | Facility: HOSPITAL | Age: 50
End: 2025-04-18
Attending: PHYSICAL MEDICINE & REHABILITATION
Payer: COMMERCIAL

## 2025-04-21 ENCOUNTER — APPOINTMENT (OUTPATIENT)
Dept: SPEECH THERAPY | Facility: HOSPITAL | Age: 50
End: 2025-04-21
Attending: PHYSICAL MEDICINE & REHABILITATION
Payer: COMMERCIAL

## 2025-04-25 ENCOUNTER — APPOINTMENT (OUTPATIENT)
Dept: SPEECH THERAPY | Facility: HOSPITAL | Age: 50
End: 2025-04-25
Attending: PHYSICAL MEDICINE & REHABILITATION
Payer: COMMERCIAL

## 2025-04-28 ENCOUNTER — APPOINTMENT (OUTPATIENT)
Dept: SPEECH THERAPY | Facility: HOSPITAL | Age: 50
End: 2025-04-28
Attending: PHYSICAL MEDICINE & REHABILITATION
Payer: COMMERCIAL

## 2025-04-30 ENCOUNTER — APPOINTMENT (OUTPATIENT)
Dept: PHYSICAL THERAPY | Facility: HOSPITAL | Age: 50
End: 2025-04-30
Attending: PHYSICAL MEDICINE & REHABILITATION
Payer: COMMERCIAL

## 2025-05-04 ENCOUNTER — TELEPHONE (OUTPATIENT)
Dept: PHYSICAL THERAPY | Facility: HOSPITAL | Age: 50
End: 2025-05-04

## 2025-05-05 ENCOUNTER — OFFICE VISIT (OUTPATIENT)
Dept: PHYSICAL THERAPY | Facility: HOSPITAL | Age: 50
End: 2025-05-05
Attending: PHYSICAL MEDICINE & REHABILITATION

## 2025-05-05 DIAGNOSIS — Z74.09 IMPAIRED MOBILITY: ICD-10-CM

## 2025-05-05 DIAGNOSIS — Z98.1 S/P CERVICAL SPINAL FUSION: ICD-10-CM

## 2025-05-05 DIAGNOSIS — M47.812 CERVICAL SPONDYLOSIS: Primary | ICD-10-CM

## 2025-05-05 PROCEDURE — 97161 PT EVAL LOW COMPLEX 20 MIN: CPT

## 2025-05-05 PROCEDURE — 97110 THERAPEUTIC EXERCISES: CPT

## 2025-05-05 NOTE — PROGRESS NOTES
SPINE EVALUATION:     Diagnosis:   Cervical  disc replacement C4-C5 , C5-C6 Patient:  Brigitte Ac (50 year old, female)        Referring Provider: Melita Figueredo  Today's Date   5/5/2025    Precautions:  Other (use comment) (neck precautions)   Date of Evaluation: 05/05/25  Next MD visit: No data recorded  Date of Surgery: 3/12/2025     PATIENT SUMMARY   Summary of chief complaints: decreased strength and endurance post cervical surgery, neck pain  History of current condition: Pt underwent neck surgery on 3/12/2025, recovery complicated by tingling /numbness and weakness requiring hospital admission. She was then transferred to [facility] but had to be readmitted due to pneumonia. She then returned to [facility] where she undewent PT and OT. Discharged April 1 and received Home PT/OT.   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  Imaging/Tests: CT and Xray of the neck   Brigitte  has a past medical history of Hospitalism.  She  has no past surgical history on file.    ASSESSMENT  Brigitte presents to physical therapy evaluation with primary c/o decreased strength and endurance post cervical surgery, neck pain. The results of the objective tests and measures show limited neck ROM and strength,  soft tissue restrictions at neck area, generalized weakness, impaired balance. Functional deficits include but are not limited to gait instability on level surface and stairs, home tasks, moving the neck , pt is not driving yet. Signs and symptoms are consistent with diagnosis of Cervical  disc replacement C4-C5 , C5-C6. Pt and PT discussed evaluation findings, pathology, POC and HEP.  Pt voiced understanding and performs HEP correctly without reported pain. Skilled Physical Therapy is medically necessary to address the above impairments and reach functional goals.    OBJECTIVE:    Musculoskeletal:  Observation/Posture: forward head posture; rounded shoulders; anterior pelvic tilt (winging of the R scapula, well healed surgical incision of the neck)   Palpation: Marked tautness of neck , UT's     Special tests:   TU seconds without device     ROM and Strength:  (* denotes performed with pain)     Cervical ROM MMT (-/5)     Flex 40 -- (Cervical mm strength graded 3+/5 within available range)     Ext 35      R L R L     Side bend 35 30         Rotation 60 60       ,   Shoulder   ROM MMT (-/5)    R L R L     Flex -- (R shoulder ROM WFL) -- (L shoulder ROM WFL)         Ext     -- (R and L shoulder strength grossly graded 3/3+)       ER             ,   Hip   ROM MMT (-/5)    R L R L     Flex (L2) -- (R and L LE ROM are WNL)   4- 4-     Ext              Abd     2 2    ,   Knee   ROM MMT (-/5)    R L R L     Flex -- (B knee and ankle ROM are WNL, strength at 4-/5)             Flexibility: Marked moderate UT and lev scapula tightness      Neurological:  Sensation:     Intact to touch but reports numbness/tingling on hands, lower legs and feet    Balance and Functional Mobility:  Gait: pt ambulates on level ground with other (use comment) (wide BUNNY, B LE's are in ER).  Balance: SLS: EO R 2 sec, EO L 3 sec          Today's Treatment and Response:   Pt education was provided on exam findings, treatment diagnosis, treatment  plan, expectations, and prognosis.  Today's Treatment       5/5/2025   Spine Treatment   Therapeutic Exercise Scapular retraction  Importance of keeping good posture, avoid rolling R shoulder forward   Manual Therapy TPR to R and L UT   Therapeutic Exercise Minutes 10   Evaluation Minutes 35   Total Time Of Timed Procedures 10   Total Time Of Service-Based Procedures 35   Total Treatment Time 45   HEP Access Code: [email or web address]  URL: [email or web address]  Date: 05/05/2025  Prepared by: Kiya Neri    Exercises  - Seated Scapular Retraction  - 3 x daily - 7 x weekly - 1 sets - 10 reps - 5 hold  Avoid excessive bending and twisting of neck until MD discontinues neck precaution        Patient was instructed in and issued a HEP for: Access Code: [email or web address]  URL: [email or web address]  Date: 05/05/2025  Prepared by: Kiya Neri    Exercises  - Seated Scapular Retraction  - 3 x daily - 7 x weekly - 1 sets - 10 reps - 5 hold  Avoid excessive bending and twisting of neck until MD discontinues neck precaution    Charges:  PT EVAL:  , 1EVAL 1 TE  In agreement with evaluation findings and clinical rationale, this evaluation involved LOW COMPLEXITY decision making due to no personal factors/comorbidities, 1-2 body structures involved/activity limitations, and stable symptoms as documented in the evaluation.                                                                         PLAN OF CARE:    Goals: (to be met in 12 visits)   Decrease neck pain to no worse than 3-4/10 so pt can perform light home tasks with minimal difficulty  Increase cervical ROM by 5-10 degrees so that she can look up and down with minimal difficulty  Pt will be independent with HEP to maintain progress achieved in PT  Pt will demonstrate good posture to decrease joint stress  Pt will be able to to SLS> 15 sec to improve overall stability during daily tasks  Increase LE strength by 1/4 to 1/2 grade to improve  stability when walking on level surface and stairs     Frequency / Duration: Patient will be seen 2x/week or a total of 12  visits over a 90 day period. Treatment will include: Gait training; Manual Therapy; Therapeutic Exercise; Home Exercise Program instruction; Neuromuscular Re-education    Education or treatment limitation: None   Rehab Potential: good     Neck Disability Index Score  Score: (Patient-Rptd) 44 % (5/5/2025  9:28 AM)      Patient/Family/Caregiver was advised of these findings, precautions, and treatment options and has agreed to actively participate in planning and for this course of care.    Thank you for your referral. Please co-sign or sign and return this letter via fax as soon as possible to [phone]. If you have any questions, please contact me at Dept: [phone]    Sincerely,  Electronically signed by therapist: Kiya Neri PT  Physician's certification required: Yes  I certify the need for these services furnished under this plan of treatment and while under my care.    X___________________________________________________ Date____________________    Certification From: 5/5/2025  To:8/3/2025

## 2025-05-08 ENCOUNTER — OFFICE VISIT (OUTPATIENT)
Dept: PHYSICAL THERAPY | Facility: HOSPITAL | Age: 50
End: 2025-05-08
Attending: PHYSICAL MEDICINE & REHABILITATION

## 2025-05-08 ENCOUNTER — TELEPHONE (OUTPATIENT)
Dept: PHYSICAL THERAPY | Facility: HOSPITAL | Age: 50
End: 2025-05-08

## 2025-05-08 PROCEDURE — 97110 THERAPEUTIC EXERCISES: CPT

## 2025-05-08 PROCEDURE — 97140 MANUAL THERAPY 1/> REGIONS: CPT

## 2025-05-09 NOTE — PROGRESS NOTES
Patient: Brigitte Ac (50 year old, female) Referring Provider:  Insurance:   Diagnosis: Cervical  disc replacement C4-C5 , C5-C6 [facility] [place]   Date of Surgery: 3/12/2025 Next MD visit:  N/A   Precautions:  Other (use comment) (post-op neck precautions) unknown Referral Information:    Date of Evaluation: Req: 0, Auth: 0, Exp:     05/05/25 POC Auth Visits:  12       Today's Date   5/8/2025    Subjective  Pateint reports she has been lifting 3# with upper body and 4# lower body and doing stationary bike as part of her HHPT program. Patient reports she still needs the walker for longer walks due to fatigue. States she still has N/T in the hands and feet.       Pain: pain not reported (pain level not rated today)     Objective  Treatment per flow sheet.          Assessment  Pt edu today in cervical anatomy with respect to cervical intrinsics and the principles of cervical stabilization training.  Patient tolerating treatment well without c/o any change in symptoms.  Note R scapular winging with resisted cervical stabilization w/ UE flexion and also noting R scapular dyskinesia with assessment of AROM flexion/scaption. Pt requiring initial verbal/tactile cuing for proper scapular \"setting\" with therex.       Plan  Continue per plan of care.    Treatment Last 4 Visits  Treatment Day: 2       5/5/2025 5/8/2025   Spine Treatment   Therapeutic Exercise Scapular retraction  Importance of keeping good posture, avoid rolling R shoulder forward Pt edu in proper scapular setting/retraction (verbal/tactile cuing) x10  Scap retrxn B ER 2x10  Ball on wall cervical stabilization w/ UE flxn 1# 2x10     Manual Therapy TPR to R and L UT Manual STM to cervical musculature  Gentle grade 2-3 manual cervical mobilization: multiple bouts each  -side glide  -rotation  Gentle manual cervical traction 10x10\" (grade 3)   Therapeutic Exercise Minutes 10 20   Manual Therapy Minutes  25   Evaluation Minutes 35    Total Time Of  Timed Procedures 10 45   Total Time Of Service-Based Procedures 35 0   Total Treatment Time 45 45   HEP Access Code: [email or web address]  URL: [email or web address]  Date: 05/05/2025  Prepared by: Kiya Neri    Exercises  - Seated Scapular Retraction  - 3 x daily - 7 x weekly - 1 sets - 10 reps - 5 hold  Avoid excessive bending and twisting of neck until MD discontinues neck precaution         HEP  Access Code: [email or web address]  URL: [email or web address]  Date: 05/05/2025  Prepared by: Kiya Gloveracruz    Exercises  - Seated Scapular Retraction  - 3 x daily - 7 x weekly - 1 sets - 10 reps - 5 hold  Avoid excessive bending and twisting of neck until MD discontinues neck precaution    Charges  2 MAN, SAMMIE

## 2025-05-12 ENCOUNTER — OFFICE VISIT (OUTPATIENT)
Dept: OCCUPATIONAL MEDICINE | Facility: HOSPITAL | Age: 50
End: 2025-05-12
Attending: PHYSICAL MEDICINE & REHABILITATION
Payer: COMMERCIAL

## 2025-05-12 ENCOUNTER — APPOINTMENT (OUTPATIENT)
Dept: PHYSICAL THERAPY | Facility: HOSPITAL | Age: 50
End: 2025-05-12
Attending: PHYSICAL MEDICINE & REHABILITATION
Payer: COMMERCIAL

## 2025-05-12 DIAGNOSIS — M47.812 CERVICAL SPONDYLOSIS: Primary | ICD-10-CM

## 2025-05-12 DIAGNOSIS — Z98.1 S/P CERVICAL SPINAL FUSION: ICD-10-CM

## 2025-05-12 DIAGNOSIS — Z74.09 IMPAIRED MOBILITY: ICD-10-CM

## 2025-05-12 PROCEDURE — 97110 THERAPEUTIC EXERCISES: CPT

## 2025-05-12 PROCEDURE — 97166 OT EVAL MOD COMPLEX 45 MIN: CPT

## 2025-05-12 NOTE — PROGRESS NOTES
OT UE EVALUATION:     Diagnosis:   Cervical spondylosis (M47.812)  Impaired mobility (Z74.09)  S/P cervical spinal fusion (Z98.1) Patient:  Brigitte Ac (50 year old, female)        Referring Provider: Melita Figueredo  Today's Date   5/12/2025    Precautions:  Other (use comment) (Spinal Precautions)   Date of Evaluation: 05/12/25  Next MD visit: TBD  Date of Injury: n/a  Date of Surgery: 3/12/2025     PATIENT SUMMARY   Summary of chief complaints: need for post-operative OT to address BUE hand weakness. Patient previously underwent neck surgery on 3/12/2025, recovery complicated by tingling /numbness and weakness requiring hospital admission. She was then transferred to [facility] but had to be readmitted due to pneumonia. She then returned to [facility] where she undewent PT and OT. Discharged April 1 and received Home PT/OT. Patient currently seeing outpatient OT at this facility to address cervical neck/shoulders and presents to OT  Pain level: current  , at best  , at worst     Occupation: retired; currently completing charitable office work,     Leisure activities/Hobbies: recreational sports   Prior level of function: (I) w/ all ADL/IADLs  Current limitations: functional use of BUE overall in terms of strength and coordination  Pt goals: improve functional strength and coordination in BUE  Hand Dominance: right  Living Situation: family    Imaging/Tests:    3/20/2025: CT Scan  CONCLUSION:    1. No acute intracranial abnormality. If there is clinical concern for acute ischemia/infarction, an MRI of the brain would be recommended for further evaluation.   2. Paranasal sinus mucosal disease as above.  Clinical correlation is suggested to exclude sinusitis.     Brigitte  has a past medical history of Hospitalism.  She  has no past surgical history on file.    ASSESSMENT  Brigitte presents to occupational therapy evaluation with primary c/o need for post-operative OT to address BUE hand weakness. Patient  previously underwent neck surgery on 3/12/2025, recovery complicated by tingling /numbness and weakness requiring hospital admission. She was then transferred to [facility] but had to be readmitted due to pneumonia. She then returned to [facility] where she undewent PT and OT. Discharged April 1 and received Home PT/OT. Patient currently seeing outpatient OT at this facility to address cervical neck/shoulders and presents to OT. The results of the objective tests and measures show decreased /pinch strength, BUE wrist and digit strength, and FMC. Functional deficits include but are not limited to functional use of BUE overall in terms of strength and coordination. Signs and symptoms are consistent with diagnosis of Cervical spondylosis (M47.812)  Impaired mobility (Z74.09)  S/P cervical spinal fusion (Z98.1). Pt and OT discussed evaluation findings, pathology, POC and HEP.  Pt voiced understanding and performs HEP correctly without reported pain. Skilled Occupational Therapy is medically necessary to address the above impairments and reach functional goals.  OBJECTIVE:      Special Tests: Nine-Hole Peg Test: R=31.7sec; L=28.6 sec     ROM and Strength: Patient demonstrates WFL BUE AROM for elbows, wrists, and digits. Strength MMT as listed below  Elbow   MMT (-/5)    R L     Flex (C6) 4+/5 4+/5     Ext (C7) 4+/5 4+/5     Pronation 4+/5 4+/5     Supination 4+/5 4+/5      Wrist   MMT (-/5)    R L     Flex (C7) 4+/5 4+/5     Ext (C6) 4-/5 4-/5        Hand Strength (lbs) R L      47.6 47.6     2 pt Pinch 4 3     3 pt Pinch 8 7     Lateral pinch 16 13          Today's Treatment and Response:   Pt education was provided on exam findings, treatment diagnosis, treatment plan, expectations, and prognosis.  Today's Treatment       5/12/2025   OT Treatment   Therapeutic Exercise -Flexion Digit Tendon Gliding Exercises  -Thumb ROM Exercises  -Yellow Theraputty Strengthening Exercises  -2-3 lb Weight Eccentric Wrist  Strengthening Exercises  -Hammer Pronation/Supination Exercises     Therapeutic Exercise Min 15   Eval Min 30   Total Timed Procedures 15   Total Service Procedures 45   Total Time 45         Patient was instructed in and issued a HEP for:   -Flexion Digit Tendon Gliding Exercises  -Thumb ROM Exercises  -Yellow Theraputty Strengthening Exercises  -2-3 lb Weight Eccentric Wrist Strengthening Exercises  -Hammer Pronation/Supination Exercises     Charges:  OT EVAL Moderate Complexity, TEx1   Based on analysis of data from a detailed assessment from an expanded chart review, clinical presentation of physical, cognitive and psychosocial skills, as well as review of patient rated outcome measures, this evaluation involved Moderate complexity decision making, with 3-5 occupational performance component deficits, possible comorbidities, and minimal to moderate need for modification of tasks or assistance.                                                                                    PLAN OF CARE:    Goals: (to be met in 12 visits)   1. Patient will demonstrate 5/5 overall BUE wrist/forearm strength to facilitate increased ADL/IADL management.  2. Patient will score 25.0 sec or less for R Nine-Hole Peg Test indicating increased ability to manage FMC.  3. Patient will demonstrate at least 55.0 lbs or greater R  strength indicating increased ability to manage functional ADL/IADLs.  4. Patient will be independent with HEP to facilitate recovery.     Frequency / Duration: Patient will be seen 2x/week or a total of 12 visits over a 90 day period. Treatment will include: Manual Therapy; Neuromuscular Re-education; Self-Care Home Management; Therapeutic Activities; Therapeutic Exercise; Home Exercise Program instruction; Electrical stimulation (attended); Ultrasound; Other (use comment) (Orthotics, Modalities PRN)    Education or treatment limitation: None   Rehab Potential: good     QuickDASH Outcome Score  Score:  (Patient-Rptd) 47.73 % (5/5/2025  9:35 AM)      Patient/Family/Caregiver was advised of these findings, precautions, and treatment options and has agreed to actively participate in planning and for this course of care.    Thank you for your referral. Please co-sign or sign and return this letter via fax as soon as possible to [phone]. If you have any questions, please contact me at Dept: [phone]    Sincerely,  Electronically signed by therapist: Cesar Garcia, OT  Physician's certification required: Yes  I certify the need for these services furnished under this plan of treatment and while under my care.    X___________________________________________________ Date____________________    Certification From: 5/12/2025  To:8/10/2025

## 2025-05-13 ENCOUNTER — OFFICE VISIT (OUTPATIENT)
Dept: PHYSICAL THERAPY | Facility: HOSPITAL | Age: 50
End: 2025-05-13
Attending: PHYSICAL MEDICINE & REHABILITATION

## 2025-05-13 PROCEDURE — 97110 THERAPEUTIC EXERCISES: CPT

## 2025-05-13 PROCEDURE — 97140 MANUAL THERAPY 1/> REGIONS: CPT

## 2025-05-13 NOTE — PROGRESS NOTES
Patient: Brigitte Ac (50 year old, female) Referring Provider:  Insurance:   Diagnosis: Cervical  disc replacement C4-C5 , C5-C6 Melita Terell  BCBS OUT OF STATE   Date of Surgery: 3/12/2025 Next MD visit:  N/A   Precautions:  None unknown Referral Information:    Date of Evaluation: Req: 0, Auth: 0, Exp:     05/05/25 POC Auth Visits:  12       Today's Date   5/13/2025    Subjective  Patient reports she tolerated PT well last session.  No c/o headache. Patient states she is still taking pain meds as needed, stating she is taking it about 4x/day.       Pain: 5/10     Objective  Treatment per flow sheet.          Assessment  Initial verbal/tactile cuing and manual placement with prone scapular retraction today to prevent UT bias and facilitate proper low trap recruitment.       Plan  Continue per plan of care.    Treatment Last 4 Visits  Treatment Day: 3       5/5/2025 5/8/2025 5/13/2025   Spine Treatment   Therapeutic Exercise Scapular retraction  Importance of keeping good posture, avoid rolling R shoulder forward Pt edu in proper scapular setting/retraction (verbal/tactile cuing) x10  Scap retrxn B ER 2x10  Ball on wall cervical stabilization w/ UE flxn 1# 2x10   Prone scapular retraction 2x10, 3 sec hold  Prone \"W\" scapular retraction lift 2x10, 3 sec hold  (Added the above to HEP)   Manual Therapy TPR to R and L UT Manual STM to cervical musculature  Gentle grade 2-3 manual cervical mobilization: multiple bouts each  -side glide  -rotation  Gentle manual cervical traction 10x10\" (grade 3) Prone STM throughout cervical and parascapular ms including UT and levator  Prone thoracic grade 2-3 PA mobs, multiple bouts  Supine STM to cervical ms  Manual segmental lateral glides grade 2, multiple bouts  Manual segmental rotation os grade 2, multiple bouts  Manual cervical traction, grade 2-3, 10x10\"  Gentle cervical PROM rotation     Therapeutic Exercise Minutes 10 20 10   Manual Therapy Minutes  25 35   Evaluation Minutes  35     Total Time Of Timed Procedures 10 45 45   Total Time Of Service-Based Procedures 35 0 0   Total Treatment Time 45 45 45   HEP Access Code: [email or web address]  URL: [email or web address]  Date: 05/05/2025  Prepared by: Kiya Neri    Exercises  - Seated Scapular Retraction  - 3 x daily - 7 x weekly - 1 sets - 10 reps - 5 hold  Avoid excessive bending and twisting of neck until MD discontinues neck precaution          HEP  Access Code: [email or web address]  URL: [email or web address]  Date: 05/05/2025  Prepared by: Kiya Neri    Exercises  - Seated Scapular Retraction  - 3 x daily - 7 x weekly - 1 sets - 10 reps - 5 hold  Avoid excessive bending and twisting of neck until MD discontinues neck precaution    Charges  2 MAN, SAMMIE

## 2025-05-15 ENCOUNTER — TELEPHONE (OUTPATIENT)
Dept: PHYSICAL THERAPY | Facility: HOSPITAL | Age: 50
End: 2025-05-15

## 2025-05-15 ENCOUNTER — APPOINTMENT (OUTPATIENT)
Dept: PHYSICAL THERAPY | Facility: HOSPITAL | Age: 50
End: 2025-05-15
Attending: PHYSICAL MEDICINE & REHABILITATION
Payer: COMMERCIAL

## 2025-05-15 ENCOUNTER — APPOINTMENT (OUTPATIENT)
Dept: OCCUPATIONAL MEDICINE | Facility: HOSPITAL | Age: 50
End: 2025-05-15
Attending: PHYSICAL MEDICINE & REHABILITATION
Payer: COMMERCIAL

## 2025-05-19 ENCOUNTER — OFFICE VISIT (OUTPATIENT)
Dept: PHYSICAL THERAPY | Facility: HOSPITAL | Age: 50
End: 2025-05-19
Attending: PHYSICAL MEDICINE & REHABILITATION

## 2025-05-19 PROCEDURE — 97110 THERAPEUTIC EXERCISES: CPT

## 2025-05-19 PROCEDURE — 97140 MANUAL THERAPY 1/> REGIONS: CPT

## 2025-05-19 NOTE — PROGRESS NOTES
Patient: Brigitte Ac (50 year old, female) Referring Provider:  Insurance:   Diagnosis: Cervical  disc replacement C4-C5 , C5-C6 Melita Lirau  BCBS OUT OF STATE   Date of Surgery: 3/12/2025 Next MD visit:  N/A   Precautions:  None unknown Referral Information:    Date of Evaluation: Req: 0, Auth: 0, Exp:     05/05/25 POC Auth Visits:  12       Today's Date   5/19/2025    Subjective  Pt reports tightness and pain of neck area. Has intermittent disturbed sleep due to pain. Reports numbness on B hands aare sllowly decreasing.       Pain: 5/10     Objective  See flow sheet. Moderate soft tissue restrictions of neck, UT and levator scap. Gait is more confident with less guarding            Assessment  Therapist instructed pt to continue to observe limited neck motion and avoid lifting anything heavy. She was also intructed on importance of good posture and R shoulder mechanics.    Goals (to be met in 12 visits)   Decrease neck pain to no worse than 3-4/10 so pt can perform light home tasks with minimal difficulty  Increase cervical ROM by 5-10 degrees so that she can look up and down with minimal difficulty  Pt will be independent with HEP to maintain progress achieved in PT  Pt will demonstrate good posture to decrease joint stress  Pt will be able to to SLS> 15 sec to improve overall stability during daily tasks  Increase LE strength by 1/4 to 1/2 grade to improve stability when walking on level surface and stairs       Plan  Continue PT per poc    Treatment Last 4 Visits  Treatment Day: 5       5/5/2025 5/8/2025 5/13/2025 5/19/2025   Spine Treatment   Therapeutic Exercise Scapular retraction  Importance of keeping good posture, avoid rolling R shoulder forward Pt edu in proper scapular setting/retraction (verbal/tactile cuing) x10  Scap retrxn B ER 2x10  Ball on wall cervical stabilization w/ UE flxn 1# 2x10   Prone scapular retraction 2x10, 3 sec hold  Prone \"W\" scapular retraction lift 2x10, 3 sec hold  (Added the  above to HEP) Prone scapular retraction 10 reps x 2  Prone with arms in goal post: scapular depression isometrics with light resistance 10 reps x 2 sets  Seated diagonal chops with theraball for neck and trunk ROM  In standing: forward and lateral reaching while leaning on one hand  H/L dead bug 10 reps x 2 sets   Manual Therapy TPR to R and L UT Manual STM to cervical musculature  Gentle grade 2-3 manual cervical mobilization: multiple bouts each  -side glide  -rotation  Gentle manual cervical traction 10x10\" (grade 3) Prone STM throughout cervical and parascapular ms including UT and levator  Prone thoracic grade 2-3 PA mobs, multiple bouts  Supine STM to cervical ms  Manual segmental lateral glides grade 2, multiple bouts  Manual segmental rotation os grade 2, multiple bouts  Manual cervical traction, grade 2-3, 10x10\"  Gentle cervical PROM rotation   Gentle STM to neck and upper thoracic area   Modalities    CP x 5 minutes to neck area   Therapeutic Exercise Minutes 10 20 10 25   Manual Therapy Minutes  25 35 15   Evaluation Minutes 35      Hot/Cold Pack Minutes    5   Total Time Of Timed Procedures 10 45 45 40   Total Time Of Service-Based Procedures 35 0 0 5   Total Treatment Time 45 45 45 45   HEP Access Code: [email or web address]  URL: [email or web address]  Date: 05/05/2025  Prepared by: Kiya Gloveracruz    Exercises  - Seated Scapular Retraction  - 3 x daily - 7 x weekly - 1 sets - 10 reps - 5 hold  Avoid excessive bending and twisting of neck until MD discontinues neck precaution   Access Code: [email or web address]  URL: [email or web address]  Date: 05/19/2025  Prepared by: Kiya Neri    Exercises  - Dead Bug  - 1 x daily - 7 x weekly - 2 sets - 10 reps        HEP  Access Code: [email or web address]  URL: [email or web address]  Date: 05/19/2025  Prepared by: Kiya Neri    Exercises  - Dead Bug  - 1 x daily - 7 x weekly - 2 sets - 10 reps    Charges  1MT 2TE

## 2025-05-20 ENCOUNTER — OFFICE VISIT (OUTPATIENT)
Dept: OCCUPATIONAL MEDICINE | Facility: HOSPITAL | Age: 50
End: 2025-05-20
Attending: PHYSICAL MEDICINE & REHABILITATION
Payer: COMMERCIAL

## 2025-05-20 PROCEDURE — 97110 THERAPEUTIC EXERCISES: CPT

## 2025-05-20 NOTE — PROGRESS NOTES
Patient: Brigitte Ac (50 year old, female) Referring Provider:  Insurance:   Diagnosis: Cervical spondylosis (M47.812)  Impaired mobility (Z74.09)  S/P cervical spinal fusion (Z98.1) Melita Figueredo  BCBS OUT OF STATE   Date of Surgery: 3/12/2025 Next MD visit:  N/A   Precautions:  Other (use comment) TBD Referral Information:   Date of Injury: n/a Date of Evaluation: Req: 0, Auth: 0, Exp:     05/12/25 POC Auth Visits:  12       Today's Date   5/20/2025    Subjective  Patient notes she has been completing home exercises. Denies pain today. Numbness/tingling to bilateral hands still present.       Pain:  /10     Objective       Special Tests: Nine-Hole Peg Test: R=31.7sec; L=28.6 sec     ROM and Strength: Patient demonstrates WFL BUE AROM for elbows, wrists, and digits. Strength MMT as listed below  Elbow   MMT (-/5)    R L     Flex (C6) 4+/5 4+/5     Ext (C7) 4+/5 4+/5     Pronation 4+/5 4+/5     Supination 4+/5 4+/5      Wrist   MMT (-/5)    R L     Flex (C7) 4+/5 4+/5     Ext (C6) 4-/5 4-/5        Hand Strength (lbs) R L      47.6 47.6     2 pt Pinch 4 3     3 pt Pinch 8 7     Lateral pinch 16 13            Assessment  Patient progressing well with therapy goals focusing on strengthening and coordination of bilateral hands. Patient progressing gradually and would benefit from continued OT services.    Goals (to be met in 12 visits)   1. Patient will demonstrate 5/5 overall BUE wrist/forearm strength to facilitate increased ADL/IADL management.  2. Patient will score 25.0 sec or less for R Nine-Hole Peg Test indicating increased ability to manage FMC.  3. Patient will demonstrate at least 55.0 lbs or greater R  strength indicating increased ability to manage functional ADL/IADLs.  4. Patient will be independent with HEP to facilitate recovery.         Plan  Patient will be seen 2x/week or a total of 12 visits over a 90 day period. Treatment will include: Manual Therapy; Neuromuscular Re-education; Self-Care  Home Management; Therapeutic Activities; Therapeutic Exercise; Home Exercise Program instruction; Electrical stimulation (attended); Ultrasound; Other (use comment) (Orthotics, Modalities PRN)    Treatment Last 4 Visits        5/12/2025 5/20/2025   OT Treatment   Treatment Day  2    2   Therapeutic Exercise -Flexion Digit Tendon Gliding Exercises  -Thumb ROM Exercises  -Yellow Theraputty Strengthening Exercises  -2-3 lb Weight Eccentric Wrist Strengthening Exercises  -Hammer Pronation/Supination Exercises   -Green Flexbar Exercises for pronation, supination, wrist flexion, wrist extension, ulnar deviation, radial deviation  -Blue Digiflex Full  2 sets of 10 reps each 5 sec hold; Red Digiflex Individual Digit 1 set of 10 reps each  -Yellow  Clip 3-Point Pinch Chinese  sorting and coordination opposition exercise   Modalities  -5 min hot pack to bilateral hands   Therapeutic Exercise Min 15 40   Eval Min 30    Total Timed Procedures 15 40   Total Service Procedures 45 45   Total Time 45 45       Multiple values from one day are sorted in reverse-chronological order         HEP   -Flexion Digit Tendon Gliding Exercises  -Thumb ROM Exercises  -Yellow Theraputty Strengthening Exercises  -2-3 lb Weight Eccentric Wrist Strengthening Exercises  -Hammer Pronation/Supination Exercises    Charges     TEx3

## 2025-05-21 ENCOUNTER — OFFICE VISIT (OUTPATIENT)
Dept: OCCUPATIONAL MEDICINE | Facility: HOSPITAL | Age: 50
End: 2025-05-21
Attending: PHYSICAL MEDICINE & REHABILITATION
Payer: COMMERCIAL

## 2025-05-21 ENCOUNTER — OFFICE VISIT (OUTPATIENT)
Dept: PHYSICAL THERAPY | Facility: HOSPITAL | Age: 50
End: 2025-05-21
Attending: PHYSICAL MEDICINE & REHABILITATION

## 2025-05-21 PROCEDURE — 97110 THERAPEUTIC EXERCISES: CPT

## 2025-05-21 PROCEDURE — 97140 MANUAL THERAPY 1/> REGIONS: CPT

## 2025-05-21 NOTE — PROGRESS NOTES
Patient: Brigitte Ac (50 year old, female) Referring Provider:  Insurance:   Diagnosis: Cervical  disc replacement C4-C5 , C5-C6 Melita Figueredo  BCBS OUT OF STATE   Date of Surgery: 3/12/2025 Next MD visit:  N/A   Precautions:  None unknown Referral Information:    Date of Evaluation: Req: 0, Auth: 0, Exp:     05/05/25 POC Auth Visits:  12       Today's Date   5/21/2025    Subjective  Patient reports she is still having neck pain and \"tightness\", stating the pain is constant and just varies in intensity.       Pain: 5/10     Objective  Treatment per flow sheet.         Assessment  Incorporated foam roll pec stretching and \"snow lay\" activity today with patient tolerating these well with no c/o pain and demonstrating improved \"opening\" of the chest.  Patient demonstrating good self awareness to avoid UT bias today with performance of scapular retraction activity on swiss ball.         Plan  Continue to progress postural strengthening and spinal stabilization training as tolerated.    Treatment Last 4 Visits  Treatment Day: 6       5/8/2025 5/13/2025 5/19/2025 5/21/2025   Spine Treatment   Therapeutic Exercise Pt edu in proper scapular setting/retraction (verbal/tactile cuing) x10  Scap retrxn B ER 2x10  Ball on wall cervical stabilization w/ UE flxn 1# 2x10   Prone scapular retraction 2x10, 3 sec hold  Prone \"W\" scapular retraction lift 2x10, 3 sec hold  (Added the above to HEP) Prone scapular retraction 10 reps x 2  Prone with arms in goal post: scapular depression isometrics with light resistance 10 reps x 2 sets  Seated diagonal chops with theraball for neck and trunk ROM  In standing: forward and lateral reaching while leaning on one hand  H/L dead bug 10 reps x 2 sets Foam roll \"T\" pec stretch 2x30\"  Foam roll \"W\" pec stretch 2x30\"  Faom roll \"snow lay\" 2x10  Prone on SB scap retrxn UE ext 2x10     Manual Therapy Manual STM to cervical musculature  Gentle grade 2-3 manual cervical mobilization: multiple bouts  each  -side glide  -rotation  Gentle manual cervical traction 10x10\" (grade 3) Prone STM throughout cervical and parascapular ms including UT and levator  Prone thoracic grade 2-3 PA mobs, multiple bouts  Supine STM to cervical ms  Manual segmental lateral glides grade 2, multiple bouts  Manual segmental rotation os grade 2, multiple bouts  Manual cervical traction, grade 2-3, 10x10\"  Gentle cervical PROM rotation   Gentle STM to neck and upper thoracic area Prone STM throughout cervical and parascapular ms including UT and levator  Prone thoracic grade 2-3 PA mobs, multiple bouts  Supine STM to cervical ms  Manual segmental lateral glides grade 2, multiple bouts  Manual segmental rotation os grade 2, multiple bouts  Manual cervical traction, grade 2-3, 6x10\"  Gentle cervical PROM rotation     Modalities   CP x 5 minutes to neck area    Therapeutic Exercise Minutes 20 10 25 15   Manual Therapy Minutes 25 35 15 30   Hot/Cold Pack Minutes   5    Total Time Of Timed Procedures 45 45 40 45   Total Time Of Service-Based Procedures 0 0 5 0   Total Treatment Time 45 45 45 45   HEP   Access Code: [email or web address]  URL: [email or web address]  Date: 05/19/2025  Prepared by: Kiya Neri    Exercises  - Dead Bug  - 1 x daily - 7 x weekly - 2 sets - 10 reps         HEP  Access Code: [email or web address]  URL: [email or web address]  Date: 05/19/2025  Prepared by: Kiya Neri    Exercises  - Dead Bug  - 1 x daily - 7 x weekly - 2 sets - 10 reps    Charges  2 MAN, SAMMIE

## 2025-05-21 NOTE — PROGRESS NOTES
Patient: Brigitte Ac (50 year old, female) Referring Provider:  Insurance:   Diagnosis: Cervical spondylosis (M47.812)  Impaired mobility (Z74.09)  S/P cervical spinal fusion (Z98.1) Melita Figueredo  BCBS OUT OF STATE   Date of Surgery: 3/12/2025 Next MD visit:  N/A   Precautions:  Other (use comment) (Spinal Precautions) TBD Referral Information:   Date of Injury: n/a Date of Evaluation: Req: 0, Auth: 0, Exp:     05/12/25 POC Auth Visits:  12       Today's Date   5/21/2025    Subjective  Patient denies pain today. Numbness/tingling to bilateral hands still present but manageable.       Pain: 0/10     Objective       Special Tests: Nine-Hole Peg Test: R=31.7sec; L=28.6 sec     ROM and Strength: Patient demonstrates WFL BUE AROM for elbows, wrists, and digits. Strength MMT as listed below  Elbow   MMT (-/5)    R L     Flex (C6) 4+/5 4+/5     Ext (C7) 4+/5 4+/5     Pronation 4+/5 4+/5     Supination 4+/5 4+/5      Wrist   MMT (-/5)    R L     Flex (C7) 4+/5 4+/5     Ext (C6) 4-/5 4-/5        Hand Strength (lbs) R L      47.6 47.6     2 pt Pinch 4 3     3 pt Pinch 8 7     Lateral pinch 16 13            Assessment  Patient progressing well with therapy goals focusing on strengthening and coordination of bilateral hands. Patient progressing gradually and would benefit from continued OT services.    Goals (to be met in 12 visits)   1. Patient will demonstrate 5/5 overall BUE wrist/forearm strength to facilitate increased ADL/IADL management.  2. Patient will score 25.0 sec or less for R Nine-Hole Peg Test indicating increased ability to manage FMC.  3. Patient will demonstrate at least 55.0 lbs or greater R  strength indicating increased ability to manage functional ADL/IADLs.  4. Patient will be independent with HEP to facilitate recovery.             Plan  Patient will be seen 2x/week or a total of 12 visits over a 90 day period. Treatment will include: Manual Therapy; Neuromuscular Re-education; Self-Care Home  Management; Therapeutic Activities; Therapeutic Exercise; Home Exercise Program instruction; Electrical stimulation (attended); Ultrasound; Other (use comment) (Orthotics, Modalities PRN)    Treatment Last 4 Visits        5/12/2025 5/20/2025 5/21/2025   OT Treatment   Treatment Day  2    2 3   Therapeutic Exercise -Flexion Digit Tendon Gliding Exercises  -Thumb ROM Exercises  -Yellow Theraputty Strengthening Exercises  -2-3 lb Weight Eccentric Wrist Strengthening Exercises  -Hammer Pronation/Supination Exercises   -Green Flexbar Exercises for pronation, supination, wrist flexion, wrist extension, ulnar deviation, radial deviation  -Blue Digiflex Full  2 sets of 10 reps each 5 sec hold; Red Digiflex Individual Digit 1 set of 10 reps each  -Yellow  Clip 3-Point Pinch Chinese  sorting and coordination opposition exercise -Green Flexbar Exercises for pronation, supination, wrist flexion, wrist extension, ulnar deviation, radial deviation   -Blue Digiflex Full  2 sets of 10 reps each 5 sec hold; Red Digiflex Individual Digit 1 set of 10 reps each   -Digit Extension Rubber Band Exercises BUE  -Red  Clip 3-Point Pinch [place] sorting and coordination opposition exercise      Modalities  -5 min hot pack to bilateral hands -5 min hot pack to bilateral hands       Therapeutic Exercise Min 15 40 40   Eval Min 30     Total Timed Procedures 15 40 40   Total Service Procedures 45 45 45   Total Time 45 45 45       Multiple values from one day are sorted in reverse-chronological order         HEP   -Flexion Digit Tendon Gliding Exercises  -Thumb ROM Exercises  -Yellow Theraputty Strengthening Exercises  -2-3 lb Weight Eccentric Wrist Strengthening Exercises  -Hammer Pronation/Supination Exercises    Charges     TEx3

## 2025-05-22 ENCOUNTER — APPOINTMENT (OUTPATIENT)
Dept: OCCUPATIONAL MEDICINE | Facility: HOSPITAL | Age: 50
End: 2025-05-22
Attending: PHYSICAL MEDICINE & REHABILITATION
Payer: COMMERCIAL

## 2025-05-27 ENCOUNTER — OFFICE VISIT (OUTPATIENT)
Dept: PHYSICAL THERAPY | Facility: HOSPITAL | Age: 50
End: 2025-05-27
Attending: PHYSICAL MEDICINE & REHABILITATION
Payer: COMMERCIAL

## 2025-05-27 PROCEDURE — 97140 MANUAL THERAPY 1/> REGIONS: CPT

## 2025-05-27 NOTE — PROGRESS NOTES
Patient: Brigitte Ac (50 year old, female) Referring Provider:  Insurance:   Diagnosis: Cervical  disc replacement C4-C5 , C5-C6 Melita Terell [employer] OUT OF STATE   Date of Surgery: 3/12/2025 Next MD visit:  N/A   Precautions:  None unknown Referral Information:    Date of Evaluation: Req: 0, Auth: 0, Exp:     05/05/25 POC Auth Visits:  12       Today's Date   5/27/2025    Subjective  Patient notes the intensity of her N/T seems to be less but is still present.  States she has been able to increase her activity level and is doing some stationary biking and walking to get activity and tolerating this well.       Pain: pain not reported     Objective  Treatment per flow sheet.          Assessment  Focused on soft tissue mobilization and ROM today as patient is presenting with initial increased tightness throughout the cervical and parascapular musculature today.  Demonstrating improved cervical rotation ROM with PROM today.       Plan  Continue per plan of care.    Treatment Last 4 Visits  Treatment Day: 7       5/13/2025 5/19/2025 5/21/2025 5/27/2025   Spine Treatment   Therapeutic Exercise Prone scapular retraction 2x10, 3 sec hold  Prone \"W\" scapular retraction lift 2x10, 3 sec hold  (Added the above to HEP) Prone scapular retraction 10 reps x 2  Prone with arms in goal post: scapular depression isometrics with light resistance 10 reps x 2 sets  Seated diagonal chops with theraball for neck and trunk ROM  In standing: forward and lateral reaching while leaning on one hand  H/L dead bug 10 reps x 2 sets Foam roll \"T\" pec stretch 2x30\"  Foam roll \"W\" pec stretch 2x30\"  Faom roll \"snow lay\" 2x10  Prone on SB scap retrxn UE ext 2x10      Manual Therapy Prone STM throughout cervical and parascapular ms including UT and levator  Prone thoracic grade 2-3 PA mobs, multiple bouts  Supine STM to cervical ms  Manual segmental lateral glides grade 2, multiple bouts  Manual segmental rotation os grade 2, multiple bouts  Manual  cervical traction, grade 2-3, 10x10\"  Gentle cervical PROM rotation   Gentle STM to neck and upper thoracic area Prone STM throughout cervical and parascapular ms including UT and levator  Prone thoracic grade 2-3 PA mobs, multiple bouts  Supine STM to cervical ms  Manual segmental lateral glides grade 2, multiple bouts  Manual segmental rotation os grade 2, multiple bouts  Manual cervical traction, grade 2-3, 6x10\"  Gentle cervical PROM rotation   Prone STM throughout cervical and parascapular ms including UT and levator  Prone thoracic grade 2-3 PA mobs, multiple bouts  Supine STM to cervical ms  Manual segmental lateral glides grade 2, multiple bouts  Manual segmental rotation mobs grade 2, multiple bouts  Manual cervical traction, grade 2-3, 10x10\"  Gentle cervical PROM rotation     Modalities  CP x 5 minutes to neck area     Therapeutic Exercise Minutes 10 25 15    Manual Therapy Minutes 35 15 30 40   Hot/Cold Pack Minutes  5     Total Time Of Timed Procedures 45 40 45 40   Total Time Of Service-Based Procedures 0 5 0 0   Total Treatment Time 45 45 45 40   HEP  Access Code: [email or web address]  URL: [email or web address]  Date: 05/19/2025  Prepared by: Kiya Neri    Exercises  - Dead Bug  - 1 x daily - 7 x weekly - 2 sets - 10 reps          HEP  Access Code: [email or web address]  URL: [email or web address]  Date: 05/19/2025  Prepared by: Kiya Neri    Exercises  - Dead Bug  - 1 x daily - 7 x weekly - 2 sets - 10 reps    Charges  3 MAN

## 2025-05-28 ENCOUNTER — APPOINTMENT (OUTPATIENT)
Dept: OCCUPATIONAL MEDICINE | Facility: HOSPITAL | Age: 50
End: 2025-05-28
Attending: PHYSICAL MEDICINE & REHABILITATION
Payer: COMMERCIAL

## 2025-05-29 ENCOUNTER — OFFICE VISIT (OUTPATIENT)
Dept: PHYSICAL THERAPY | Facility: HOSPITAL | Age: 50
End: 2025-05-29
Attending: PHYSICAL MEDICINE & REHABILITATION
Payer: COMMERCIAL

## 2025-05-29 PROCEDURE — 97110 THERAPEUTIC EXERCISES: CPT

## 2025-05-29 PROCEDURE — 97140 MANUAL THERAPY 1/> REGIONS: CPT

## 2025-05-29 NOTE — PROGRESS NOTES
Patient: Brigitte Ac (50 year old, female) Referring Provider:  Insurance:   Diagnosis: Cervical  disc replacement C4-C5 , C5-C6 Melita Terell [employer] OUT OF STATE   Date of Surgery: 3/12/2025 Next MD visit:  N/A   Precautions:  None unknown Referral Information:    Date of Evaluation: Req: 0, Auth: 0, Exp:     05/05/25 POC Auth Visits:  12       Today's Date   5/29/2025    Subjective  Pain relief from last visit lasted for a day and then returned. Numbness on her fingers are not quite as strong but still having difficulty with fine motor tasks. She is happy with the way she walks and building her endurance.       Pain: 6/10     Objective  Cervical ROM: Flex 40 Extension 37 RLF 25 LLF 30 R and L rotation 57. Well healed neck surgical scar. Moderate muscle spasm of B UT, lev scapula, more on the L. Mild winging of the R scapula.            Assessment  Pt is gradually making progress overall. she still reports moderate neck pain and tightness. She feels her walking has improved significantly. NUmbness on B hands although better still persist and sh reports difficulty with fine motor activities. She is currently in OT. She reported mild decrease in pain, increased flexibility at end of visit.Continue working towards set goals.    Goals (to be met in 12 visits)   Decrease neck pain to no worse than 3-4/10 so pt can perform light home tasks with minimal difficulty  Increase cervical ROM by 5-10 degrees so that she can look up and down with minimal difficulty  Pt will be independent with HEP to maintain progress achieved in PT  Pt will demonstrate good posture to decrease joint stress  Pt will be able to to SLS> 15 sec to improve overall stability during daily tasks  Increase LE strength by 1/4 to 1/2 grade to improve stability when walking on level surface and stairs         Plan  Continue PT per poc    Treatment Last 4 Visits  Treatment Day: 7       5/19/2025 5/21/2025 5/27/2025 5/29/2025   Spine Treatment   Therapeutic  Exercise Prone scapular retraction 10 reps x 2  Prone with arms in goal post: scapular depression isometrics with light resistance 10 reps x 2 sets  Seated diagonal chops with theraball for neck and trunk ROM  In standing: forward and lateral reaching while leaning on one hand  H/L dead bug 10 reps x 2 sets Foam roll \"T\" pec stretch 2x30\"  Foam roll \"W\" pec stretch 2x30\"  Faom roll \"snow lay\" 2x10  Prone on SB scap retrxn UE ext 2x10    Prone scapular retraction, horizontal abduction   In prone with arms in goal post position: on/off scapular depression and retraction isometrics  Supine scapular retraction  On dynadisc: slouch correction x 10  Standing leaning on B hands: LUE forward and lateral reaches   Manual Therapy Gentle STM to neck and upper thoracic area Prone STM throughout cervical and parascapular ms including UT and levator  Prone thoracic grade 2-3 PA mobs, multiple bouts  Supine STM to cervical ms  Manual segmental lateral glides grade 2, multiple bouts  Manual segmental rotation os grade 2, multiple bouts  Manual cervical traction, grade 2-3, 6x10\"  Gentle cervical PROM rotation   Prone STM throughout cervical and parascapular ms including UT and levator  Prone thoracic grade 2-3 PA mobs, multiple bouts  Supine STM to cervical ms  Manual segmental lateral glides grade 2, multiple bouts  Manual segmental rotation mobs grade 2, multiple bouts  Manual cervical traction, grade 2-3, 10x10\"  Gentle cervical PROM rotation   STM to neck and upper back, over UT and levator scapula.  Passive scapular depressions, passive shoulder depressions     Modalities CP x 5 minutes to neck area      Therapeutic Exercise Minutes 25 15  27   Manual Therapy Minutes 15 30 40 15   Hot/Cold Pack Minutes 5      Total Time Of Timed Procedures 40 45 40 42   Total Time Of Service-Based Procedures 5 0 0 0   Total Treatment Time 45 45 40 42   HEP Access Code: [email or web address]  URL: [email or web address]  Date:  05/19/2025  Prepared by: Kiya Neri    Exercises  - Dead Bug  - 1 x daily - 7 x weekly - 2 sets - 10 reps   Instructed on good posture/body alignment  Adjust work space, avoid looking down on cell phone for long periods of time. Support arms with pillow to hold computer/cell phone at eye level  Set alarm and take rest breaks from computer work every 1 hour and do flexibility exercises.   She verbalized understanding of all instructions        HEP  Instructed on good posture/body alignment  Adjust work space, avoid looking down on cell phone for long periods of time. Support arms with pillow to hold computer/cell phone at eye level  Set alarm and take rest breaks from computer work every 1 hour and do flexibility exercises.   She verbalized understanding of all instructions    Charges  1MT 2TE

## 2025-05-30 ENCOUNTER — OFFICE VISIT (OUTPATIENT)
Dept: OCCUPATIONAL MEDICINE | Facility: HOSPITAL | Age: 50
End: 2025-05-30
Attending: PHYSICAL MEDICINE & REHABILITATION
Payer: COMMERCIAL

## 2025-05-30 PROCEDURE — 97110 THERAPEUTIC EXERCISES: CPT

## 2025-05-30 NOTE — PROGRESS NOTES
Patient: Brigitte Ac (50 year old, female) Referring Provider:  Insurance:   Diagnosis: Cervical spondylosis (M47.812)  Impaired mobility (Z74.09)  S/P cervical spinal fusion (Z98.1) Melita Figueredo  BCBS OUT OF STATE   Date of Surgery: 3/12/2025 Next MD visit:  N/A   Precautions:  Other (use comment) (Spinal Precautions) TBD Referral Information:   Date of Injury: n/a Date of Evaluation: Req: 0, Auth: 0, Exp:     05/12/25 POC Auth Visits:  12       Today's Date   5/30/2025    Subjective  Patient completing home exercises and practicing typing. Feeling more independent overall and denies pain today.       Pain: 0/10     Objective       Special Tests: Nine-Hole Peg Test: R=31.7sec; L=28.6 sec     ROM and Strength: Patient demonstrates WFL BUE AROM for elbows, wrists, and digits. Strength MMT as listed below  Elbow   MMT (-/5)    R L     Flex (C6) 4+/5 4+/5     Ext (C7) 4+/5 4+/5     Pronation 4+/5 4+/5     Supination 4+/5 4+/5      Wrist   MMT (-/5)    R L     Flex (C7) 4+/5 4+/5     Ext (C6) 4-/5 4-/5        Hand Strength (lbs) R L      47.6 47.6     2 pt Pinch 4 3     3 pt Pinch 8 7     Lateral pinch 16 13            Assessment  Patient progressing well with therapy goals focusing on strengthening and coordination of bilateral hands. Patient progressing gradually and would benefit from continued OT services.    Goals (to be met in 12 visits)   1. Patient will demonstrate 5/5 overall BUE wrist/forearm strength to facilitate increased ADL/IADL management.  2. Patient will score 25.0 sec or less for R Nine-Hole Peg Test indicating increased ability to manage FMC.  3. Patient will demonstrate at least 55.0 lbs or greater R  strength indicating increased ability to manage functional ADL/IADLs.  4. Patient will be independent with HEP to facilitate recovery.                 Plan  Patient will be seen 2x/week or a total of 12 visits over a 90 day period. Treatment will include: Manual Therapy; Neuromuscular  Re-education; Self-Care Home Management; Therapeutic Activities; Therapeutic Exercise; Home Exercise Program instruction; Electrical stimulation (attended); Ultrasound; Other (use comment) (Orthotics, Modalities PRN)    Treatment Last 4 Visits        5/12/2025 5/20/2025 5/21/2025 5/30/2025   OT Treatment   Treatment Day  2    2 3 4   Therapeutic Exercise -Flexion Digit Tendon Gliding Exercises  -Thumb ROM Exercises  -Yellow Theraputty Strengthening Exercises  -2-3 lb Weight Eccentric Wrist Strengthening Exercises  -Hammer Pronation/Supination Exercises   -Green Flexbar Exercises for pronation, supination, wrist flexion, wrist extension, ulnar deviation, radial deviation  -Blue Digiflex Full  2 sets of 10 reps each 5 sec hold; Red Digiflex Individual Digit 1 set of 10 reps each  -Yellow  Clip 3-Point Pinch Chinese  sorting and coordination opposition exercise -Green Flexbar Exercises for pronation, supination, wrist flexion, wrist extension, ulnar deviation, radial deviation   -Blue Digiflex Full  2 sets of 10 reps each 5 sec hold; Red Digiflex Individual Digit 1 set of 10 reps each   -Digit Extension Rubber Band Exercises BUE  -Red  Clip 3-Point Pinch [place] sorting and coordination opposition exercise    -Green Flexbar Exercises for pronation, supination, wrist flexion, wrist extension, ulnar deviation, radial deviation   -Black Digiflex Full  2 sets of 10 reps each 5 sec hold; Red Digiflex Individual Digit 1 set of 10 reps each   -Green  Clip 3-Point Pinch 1 set of 10 reps each bilaterally 5 sec hold     Modalities  -5 min hot pack to bilateral hands -5 min hot pack to bilateral hands        Therapeutic Exercise Min 15 40 40 40   Eval Min 30      Total Timed Procedures 15 40 40 40   Total Service Procedures 45 45 45 40   Total Time 45 45 45 40       Multiple values from one day are sorted in reverse-chronological order         HEP    -Flexion Digit Tendon Gliding  Exercises  -Thumb ROM Exercises  -Yellow Theraputty Strengthening Exercises  -2-3 lb Weight Eccentric Wrist Strengthening Exercises  -Hammer Pronation/Supination Exercises    Charges     TEx3

## 2025-06-02 ENCOUNTER — APPOINTMENT (OUTPATIENT)
Dept: PHYSICAL THERAPY | Facility: HOSPITAL | Age: 50
End: 2025-06-02
Attending: PHYSICAL MEDICINE & REHABILITATION
Payer: COMMERCIAL

## 2025-06-02 ENCOUNTER — OFFICE VISIT (OUTPATIENT)
Dept: OCCUPATIONAL MEDICINE | Facility: HOSPITAL | Age: 50
End: 2025-06-02
Attending: PHYSICAL MEDICINE & REHABILITATION

## 2025-06-02 PROCEDURE — 97110 THERAPEUTIC EXERCISES: CPT

## 2025-06-02 NOTE — PROGRESS NOTES
Patient: Brigitte Ac (50 year old, female) Referring Provider:  Insurance:   Diagnosis: Cervical spondylosis (M47.812)  Impaired mobility (Z74.09)  S/P cervical spinal fusion (Z98.1) Melita Figueredo  BCBS OUT OF STATE   Date of Surgery: 3/12/2025 Next MD visit:  N/A   Precautions:  Other (use comment) (Spinal Precautions) TBD Referral Information:   Date of Injury: n/a Date of Evaluation: Req: 0, Auth: 0, Exp:     05/12/25 POC Auth Visits:  12       Today's Date   6/2/2025    Subjective  Patient completing home exercises and feeling improvement overall with use of BUE. Denies pain today.       Pain: 0/10     Objective       Special Tests: Nine-Hole Peg Test: R=31.7sec; L=28.6 sec     ROM and Strength: Patient demonstrates WFL BUE AROM for elbows, wrists, and digits. Strength MMT as listed below  Elbow   MMT (-/5)    R L     Flex (C6) 4+/5 4+/5     Ext (C7) 4+/5 4+/5     Pronation 4+/5 4+/5     Supination 4+/5 4+/5      Wrist   MMT (-/5)    R L     Flex (C7) 4+/5 4+/5     Ext (C6) 4-/5 4-/5        Hand Strength (lbs) R L      47.6 47.6     2 pt Pinch 4 3     3 pt Pinch 8 7     Lateral pinch 16 13            Assessment  Patient progressing well with therapy goals focusing on strengthening and coordination of bilateral hands. Patient progressing gradually and would benefit from continued OT services.    Goals (to be met in 12 visits)   1. Patient will demonstrate 5/5 overall BUE wrist/forearm strength to facilitate increased ADL/IADL management.  2. Patient will score 25.0 sec or less for R Nine-Hole Peg Test indicating increased ability to manage FMC.  3. Patient will demonstrate at least 55.0 lbs or greater R  strength indicating increased ability to manage functional ADL/IADLs.  4. Patient will be independent with HEP to facilitate recovery.                     Plan  Patient will be seen 2x/week or a total of 12 visits over a 90 day period. Treatment will include: Manual Therapy; Neuromuscular Re-education;  Self-Care Home Management; Therapeutic Activities; Therapeutic Exercise; Home Exercise Program instruction; Electrical stimulation (attended); Ultrasound; Other (use comment) (Orthotics, Modalities PRN)    Treatment Last 4 Visits        5/20/2025 5/21/2025 5/30/2025 6/2/2025   OT Treatment   Treatment Day 2    2 3 4 5   Therapeutic Exercise -Green Flexbar Exercises for pronation, supination, wrist flexion, wrist extension, ulnar deviation, radial deviation  -Blue Digiflex Full  2 sets of 10 reps each 5 sec hold; Red Digiflex Individual Digit 1 set of 10 reps each  -Yellow  Clip 3-Point Pinch Chinese  sorting and coordination opposition exercise -Green Flexbar Exercises for pronation, supination, wrist flexion, wrist extension, ulnar deviation, radial deviation   -Blue Digiflex Full  2 sets of 10 reps each 5 sec hold; Red Digiflex Individual Digit 1 set of 10 reps each   -Digit Extension Rubber Band Exercises BUE  -Red  Clip 3-Point Pinch [place] sorting and coordination opposition exercise    -Green Flexbar Exercises for pronation, supination, wrist flexion, wrist extension, ulnar deviation, radial deviation   -Black Digiflex Full  2 sets of 10 reps each 5 sec hold; Red Digiflex Individual Digit 1 set of 10 reps each   -Green  Clip 3-Point Pinch 1 set of 10 reps each bilaterally 5 sec hold   -4 lb Weight Forward Reaching and Bilateral reaching exercises 2 sets of 10 reps each  -Green Flexbar Exercises for pronation, supination, wrist flexion, wrist extension, ulnar deviation, radial deviation   -Black Digiflex Full  2 sets of 10 reps each 5 sec hold; Red Digiflex Individual Digit 1 set of 10 reps each   -Green  Clip 3-Point Pinch 1 set of 10 reps each bilaterally 5 sec hold [place] sorting exercises     Modalities -5 min hot pack to bilateral hands -5 min hot pack to bilateral hands         Therapeutic Exercise Min 40 40 40 45   Total Timed Procedures 40 40 40 45    Total Service Procedures 45 45 40 45   Total Time 45 45 40 45       Multiple values from one day are sorted in reverse-chronological order         HEP   -Flexion Digit Tendon Gliding Exercises  -Thumb ROM Exercises  -Yellow Theraputty Strengthening Exercises  -2-3 lb Weight Eccentric Wrist Strengthening Exercises  -Hammer Pronation/Supination Exercises    Charges     TEx3

## 2025-06-04 ENCOUNTER — APPOINTMENT (OUTPATIENT)
Dept: PHYSICAL THERAPY | Facility: HOSPITAL | Age: 50
End: 2025-06-04
Attending: PHYSICAL MEDICINE & REHABILITATION
Payer: COMMERCIAL

## 2025-06-04 ENCOUNTER — TELEPHONE (OUTPATIENT)
Dept: PHYSICAL THERAPY | Facility: HOSPITAL | Age: 50
End: 2025-06-04

## 2025-06-05 ENCOUNTER — APPOINTMENT (OUTPATIENT)
Dept: OCCUPATIONAL MEDICINE | Facility: HOSPITAL | Age: 50
End: 2025-06-05
Attending: PHYSICAL MEDICINE & REHABILITATION
Payer: COMMERCIAL

## 2025-06-05 ENCOUNTER — OFFICE VISIT (OUTPATIENT)
Dept: PHYSICAL THERAPY | Facility: HOSPITAL | Age: 50
End: 2025-06-05
Attending: PHYSICAL MEDICINE & REHABILITATION
Payer: COMMERCIAL

## 2025-06-05 PROCEDURE — 97110 THERAPEUTIC EXERCISES: CPT

## 2025-06-05 PROCEDURE — 97140 MANUAL THERAPY 1/> REGIONS: CPT

## 2025-06-06 NOTE — PROGRESS NOTES
"Surgery Drops: I have given the patient the option to chose whether they would like a prescribed regimen of drops or an all-in-one drop for use before and after cataract surgery. They have elected to use the all-in-one option of Pred-Gati-Brom (prednisolone acetate, gatifloxacin and bromfenac). Patient to administer ""as directed"". " Patient: Brigitte Ac (50 year old, female) Referring Provider:  Insurance:   Diagnosis: Cervical  disc replacement C4-C5 , C5-C6 Melita Lirajaden [employer] OUT OF STATE   Date of Surgery: 3/12/2025 Next MD visit:  N/A   Precautions:  None unknown Referral Information:    Date of Evaluation: Req: 0, Auth: 0, Exp:     05/05/25 POC Auth Visits:  12       Today's Date   6/5/2025    Subjective  Patient reports she is having \"stiffness\" in the upper trap region on the L and along the medial border of the left shoulder.       Pain: 6/10     Objective  Treatment per flow sheet.          Assessment  Patient continues to have mild scapular winging on R with active overhead flexion which is exxacerbated with load but this is diminished from earlier assessments.  Patient is reporting no increase in pain today with performance of CKC exercises for UE loading and demonstrating good cervical and parascapular stabilization with this.       Plan  Continue PT per poc    Treatment Last 4 Visits  Treatment Day: 8       5/21/2025 5/27/2025 5/29/2025 6/5/2025   Spine Treatment   Therapeutic Exercise Foam roll \"T\" pec stretch 2x30\"  Foam roll \"W\" pec stretch 2x30\"  Faom roll \"snow lay\" 2x10  Prone on SB scap retrxn UE ext 2x10    Prone scapular retraction, horizontal abduction   In prone with arms in goal post position: on/off scapular depression and retraction isometrics  Supine scapular retraction  On dynadisc: slouch correction x 10  Standing leaning on B hands: LUE forward and lateral reaches Table inclined plank position scapular protraction/retraction 2x10  Table inclined plank position alternating UE flxn 2x10  SB sitting (sizing for home use) - anticipate progressing HEP to incorporate additional core and postural strengthening   Manual Therapy Prone STM throughout cervical and parascapular ms including UT and levator  Prone thoracic grade 2-3 PA mobs, multiple bouts  Supine STM to cervical ms  Manual segmental lateral glides grade 2,  multiple bouts  Manual segmental rotation os grade 2, multiple bouts  Manual cervical traction, grade 2-3, 6x10\"  Gentle cervical PROM rotation   Prone STM throughout cervical and parascapular ms including UT and levator  Prone thoracic grade 2-3 PA mobs, multiple bouts  Supine STM to cervical ms  Manual segmental lateral glides grade 2, multiple bouts  Manual segmental rotation mobs grade 2, multiple bouts  Manual cervical traction, grade 2-3, 10x10\"  Gentle cervical PROM rotation   STM to neck and upper back, over UT and levator scapula.  Passive scapular depressions, passive shoulder depressions   Hkly STM to cervical, UT, levator ms  Gentle manual cervical trxn 5x10\"rade 2-3 cervical segmental mobs: lateral glide and rotation  Gentle manual cervical PROM rotation  Prone STM to parascapular and posterior shoulder ms   Therapeutic Exercise Minutes 15  27 10   Manual Therapy Minutes 30 40 15 35   Total Time Of Timed Procedures 45 40 42 45   Total Time Of Service-Based Procedures 0 0 0 0   Total Treatment Time 45 40 42 45   HEP   Instructed on good posture/body alignment  Adjust work space, avoid looking down on cell phone for long periods of time. Support arms with pillow to hold computer/cell phone at eye level  Set alarm and take rest breaks from computer work every 1 hour and do flexibility exercises.   She verbalized understanding of all instructions         HEP  Instructed on good posture/body alignment  Adjust work space, avoid looking down on cell phone for long periods of time. Support arms with pillow to hold computer/cell phone at eye level  Set alarm and take rest breaks from computer work every 1 hour and do flexibility exercises.   She verbalized understanding of all instructions    Destiny  2MAN, SAMMIE

## 2025-06-09 ENCOUNTER — OFFICE VISIT (OUTPATIENT)
Dept: PHYSICAL THERAPY | Facility: HOSPITAL | Age: 50
End: 2025-06-09
Attending: PHYSICAL MEDICINE & REHABILITATION
Payer: COMMERCIAL

## 2025-06-09 ENCOUNTER — OFFICE VISIT (OUTPATIENT)
Dept: OCCUPATIONAL MEDICINE | Facility: HOSPITAL | Age: 50
End: 2025-06-09
Attending: PHYSICAL MEDICINE & REHABILITATION
Payer: COMMERCIAL

## 2025-06-09 PROCEDURE — 97110 THERAPEUTIC EXERCISES: CPT

## 2025-06-09 PROCEDURE — 97140 MANUAL THERAPY 1/> REGIONS: CPT

## 2025-06-09 NOTE — PROGRESS NOTES
Patient: Brigitte Ac (50 year old, female) Referring Provider:  Insurance:   Diagnosis: Cervical spondylosis (M47.812)  Impaired mobility (Z74.09)  S/P cervical spinal fusion (Z98.1) Melita Figueredo  BCBS OUT OF STATE   Date of Surgery: 3/12/2025 Next MD visit:  N/A   Precautions:  Other (use comment) (Spinal Precautions) TBD Referral Information:   Date of Injury: n/a Date of Evaluation: Req: 0, Auth: 0, Exp:     05/12/25 POC Auth Visits:  12       Today's Date   6/9/2025    Subjective  Patient notes she as been completing home exercises. Feeling stronger overall.       Pain: 0/10     Objective       Special Tests: Nine-Hole Peg Test: R=27.4sec; L=23.4 sec     ROM and Strength: Patient demonstrates WFL BUE AROM for elbows, wrists, and digits. Strength MMT as listed below  Elbow   MMT (-/5)    R L     Flex (C6) 5/5 5/5     Ext (C7) 5/5 5/5     Pronation 5/5 5/5     Supination 5/5 5/5      Wrist   MMT (-/5)    R L     Flex (C7) 5/5 5/5     Ext (C6) 5/5 5/5        Hand Strength (lbs) R L      66.4 55.4     2 pt Pinch 9 7     3 pt Pinch 11 11     Lateral pinch 17 15            Assessment  Re-Assessment of strength and coordination taken today. Patient demonstrated 5/5 overall BUE wrist/forearm strength and improved /pinch measurements bilaterally today. Improvement overall in Nine-Hole Peg Test completion overall.    Goals (to be met in 12 visits)   1. Patient will demonstrate 5/5 overall BUE wrist/forearm strength to facilitate increased ADL/IADL management.  2. Patient will score 25.0 sec or less for R Nine-Hole Peg Test indicating increased ability to manage FMC.  3. Patient will demonstrate at least 55.0 lbs or greater R  strength indicating increased ability to manage functional ADL/IADLs.  4. Patient will be independent with HEP to facilitate recovery.                         Plan  Patient will be seen 2x/week or a total of 12 visits over a 90 day period. Treatment will include: Manual Therapy;  Neuromuscular Re-education; Self-Care Home Management; Therapeutic Activities; Therapeutic Exercise; Home Exercise Program instruction; Electrical stimulation (attended); Ultrasound; Other (use comment) (Orthotics, Modalities PRN)    Treatment Last 4 Visits        5/21/2025 5/30/2025 6/2/2025 6/9/2025   OT Treatment   Treatment Day 3 4 5 6    6   Therapeutic Exercise -Green Flexbar Exercises for pronation, supination, wrist flexion, wrist extension, ulnar deviation, radial deviation   -Blue Digiflex Full  2 sets of 10 reps each 5 sec hold; Red Digiflex Individual Digit 1 set of 10 reps each   -Digit Extension Rubber Band Exercises BUE  -Red  Clip 3-Point Pinch [place] sorting and coordination opposition exercise    -Green Flexbar Exercises for pronation, supination, wrist flexion, wrist extension, ulnar deviation, radial deviation   -Black Digiflex Full  2 sets of 10 reps each 5 sec hold; Red Digiflex Individual Digit 1 set of 10 reps each   -Green  Clip 3-Point Pinch 1 set of 10 reps each bilaterally 5 sec hold   -4 lb Weight Forward Reaching and Bilateral reaching exercises 2 sets of 10 reps each  -Green Flexbar Exercises for pronation, supination, wrist flexion, wrist extension, ulnar deviation, radial deviation   -Black Digiflex Full  2 sets of 10 reps each 5 sec hold; Red Digiflex Individual Digit 1 set of 10 reps each   -Green  Clip 3-Point Pinch 1 set of 10 reps each bilaterally 5 sec hold [place] sorting exercises   -Re-Assessment of Objective Measures  -Green Flexbar Exercises for pronation, supination, wrist flexion, wrist extension  - Bar 40 lb 2 sets of 10 reps each bilaterally  -60 [place] FMC/In-Hand Manipulation and coordination sorting activity   Modalities -5 min hot pack to bilateral hands          Therapeutic Exercise Min 40 40 45 38   Total Timed Procedures 40 40 45 38   Total Service Procedures 45 40 45 38   Total Time 45 40 45 38       Multiple values  from one day are sorted in reverse-chronological order         HEP   -Flexion Digit Tendon Gliding Exercises  -Thumb ROM Exercises  -Yellow Theraputty Strengthening Exercises  -2-3 lb Weight Eccentric Wrist Strengthening Exercises  -Hammer Pronation/Supination Exercises    Charges     TEx3

## 2025-06-09 NOTE — PROGRESS NOTES
Patient: Brigitte Ac (50 year old, female) Referring Provider:  Insurance:   Diagnosis: Cervical  disc replacement C4-C5 , C5-C6 Melita Figueredo  BCBS OUT OF STATE   Date of Surgery: 3/12/2025 Next MD visit:  N/A   Precautions:  None unknown Referral Information:    Date of Evaluation: Req: 0, Auth: 0, Exp:     05/05/25 POC Auth Visits:  12       Today's Date   6/9/2025    Subjective  Pt reports continued tightness/stiffness of the neck area       Pain: 6/10     Objective  See flow sheet. Reinforced importance of maintaining fgood posture and avoiding forward neck position, she verbalized understanding. Moderate soft tissue restrictions yielded to STM and down to minimal level at end of visit. Still with mild R scapular winging .            Assessment  Pt reports conitnued pain/stiffness of neck area. Discussed  pain symptoms with pt and at this time she is unable to distinguish if neck symptoms are post op pain vs remaining disc problems that also need to be treated. Per pt further surgery will be done next year. At end of visit pt reported feeling increased neck flexibility.    Goals (to be met in 12 visits)   Decrease neck pain to no worse than 3-4/10 so pt can perform light home tasks with minimal difficulty  Increase cervical ROM by 5-10 degrees so that she can look up and down with minimal difficulty  Pt will be independent with HEP to maintain progress achieved in PT-progressing  Pt will demonstrate good posture to decrease joint stress- progressing  Pt will be able to to SLS> 15 sec to improve overall stability during daily tasks  Increase LE strength by 1/4 to 1/2 grade to improve stability when walking on level surface and stairs-per pt she is now back to her baseline and has no other balance and LE strength concerns therefore discontinue this goal           Plan  Continue PT per poc    Treatment Last 4 Visits  Treatment Day: 9       5/27/2025 5/29/2025 6/5/2025 6/9/2025   Spine Treatment   Therapeutic  Exercise  Prone scapular retraction, horizontal abduction   In prone with arms in goal post position: on/off scapular depression and retraction isometrics  Supine scapular retraction  On dynadisc: slouch correction x 10  Standing leaning on B hands: LUE forward and lateral reaches Table inclined plank position scapular protraction/retraction 2x10  Table inclined plank position alternating UE flxn 2x10  SB sitting (sizing for home use) - anticipate progressing HEP to incorporate additional core and postural strengthening Partial chin tucks  Active neck ROM into Flex/EXT. R and L SB, R amd L  rotation  Pectoral doorway stretches  IN prone with arms in goal post : On/off scapular retraction and depression isometrics 5 reps x 2 sets  Standing scap rows with yellow band 20 reps  Seated diagonal chops with theraball  On dynadisc: slouch/correct for postural correction   Manual Therapy Prone STM throughout cervical and parascapular ms including UT and levator  Prone thoracic grade 2-3 PA mobs, multiple bouts  Supine STM to cervical ms  Manual segmental lateral glides grade 2, multiple bouts  Manual segmental rotation mobs grade 2, multiple bouts  Manual cervical traction, grade 2-3, 10x10\"  Gentle cervical PROM rotation   STM to neck and upper back, over UT and levator scapula.  Passive scapular depressions, passive shoulder depressions   Hkly STM to cervical, UT, levator ms  Gentle manual cervical trxn 5x10\"rade 2-3 cervical segmental mobs: lateral glide and rotation  Gentle manual cervical PROM rotation  Prone STM to parascapular and posterior shoulder ms STM to neck and upper back , UT. TPR to R UT   Modalities    Brief CP to neck at end of visit   Therapeutic Exercise Minutes  27 10 25   Manual Therapy Minutes 40 15 35 20   Hot/Cold Pack Minutes    5   Total Time Of Timed Procedures 40 42 45 45   Total Time Of Service-Based Procedures 0 0 0 5   Total Treatment Time 40 42 45 50   HEP  Instructed on good posture/body  alignment  Adjust work space, avoid looking down on cell phone for long periods of time. Support arms with pillow to hold computer/cell phone at eye level  Set alarm and take rest breaks from computer work every 1 hour and do flexibility exercises.   She verbalized understanding of all instructions  Continue HEP        HEP  Continue HEP    Charges  1MT 2TE

## 2025-06-11 ENCOUNTER — APPOINTMENT (OUTPATIENT)
Dept: OCCUPATIONAL MEDICINE | Facility: HOSPITAL | Age: 50
End: 2025-06-11
Attending: PHYSICAL MEDICINE & REHABILITATION
Payer: COMMERCIAL

## 2025-06-16 ENCOUNTER — APPOINTMENT (OUTPATIENT)
Dept: OCCUPATIONAL MEDICINE | Facility: HOSPITAL | Age: 50
End: 2025-06-16
Attending: PHYSICAL MEDICINE & REHABILITATION
Payer: COMMERCIAL

## 2025-06-16 PROCEDURE — 97110 THERAPEUTIC EXERCISES: CPT

## 2025-06-16 NOTE — PROGRESS NOTES
[place]  Pt has attended 7 visits in Occupational Therapy.   Patient: Brigitte Ac (50 year old, female) Referring Provider:  Insurance:   Diagnosis: Cervical spondylosis (M47.812)  Impaired mobility (Z74.09)  S/P cervical spinal fusion (Z98.1) Melita Figueredo  BCBS OUT OF STATE   Date of Surgery: 3/12/2025 Next MD visit:  N/A   Precautions:  Other (use comment) (Spinal Precautions) TBD Referral Information:   Date of Injury: n/a Date of Evaluation: Req: 0, Auth: 0, Exp:     05/12/25 POC Auth Visits:  12       Today's Date   6/16/2025    Subjective  Patient notes she has been completing home exercises. Patient feels stronger overall but numbness/tingling still present. Patient feels confident to continue with HEP at this time.       Pain: 0/10     Objective       Special Tests: Nine-Hole Peg Test: R=23.3 sec; L=23.4 sec     ROM and Strength: Patient demonstrates WFL BUE AROM for elbows, wrists, and digits. Strength MMT as listed below  Elbow   MMT (-/5)    R L     Flex (C6) 5/5 5/5     Ext (C7) 5/5 5/5     Pronation 5/5 5/5     Supination 5/5 5/5      Wrist   MMT (-/5)    R L     Flex (C7) 5/5 5/5     Ext (C6) 5/5 5/5        Hand Strength (lbs) R L      77.8 62.6     2 pt Pinch 8 9     3 pt Pinch 14 11     Lateral pinch 20 17            Assessment  Patient demonstrates overall improvement in BUE strength and coordination. 5/5 overall BUE elbow, forearm, wrist strength and improving /pinch strength. Nine-Hole Peg Test scores improved under 25.0 sec bilaterally. Patient okay to discharge from formal OT and okay to discharge from formal OT services and continuing with HEP.    Goals (to be met in 12 visits)   1. Patient will demonstrate 5/5 overall BUE wrist/forearm strength to facilitate increased ADL/IADL management.  2. Patient will score 25.0 sec or less for R Nine-Hole Peg Test indicating increased ability to manage FMC.  3. Patient will demonstrate at least 55.0 lbs or greater R  strength  indicating increased ability to manage functional ADL/IADLs.  4. Patient will be independent with HEP to facilitate recovery.                             Plan  Patient will be seen 2x/week or a total of 12 visits over a 90 day period. Treatment will include: Manual Therapy; Neuromuscular Re-education; Self-Care Home Management; Therapeutic Activities; Therapeutic Exercise; Home Exercise Program instruction; Electrical stimulation (attended); Ultrasound; Other (use comment) (Orthotics, Modalities PRN)    Treatment Last 4 Visits        5/30/2025 6/2/2025 6/9/2025 6/16/2025   OT Treatment   Treatment Day 4 5 6    6 7   Therapeutic Exercise -Green Flexbar Exercises for pronation, supination, wrist flexion, wrist extension, ulnar deviation, radial deviation   -Black Digiflex Full  2 sets of 10 reps each 5 sec hold; Red Digiflex Individual Digit 1 set of 10 reps each   -Green  Clip 3-Point Pinch 1 set of 10 reps each bilaterally 5 sec hold   -4 lb Weight Forward Reaching and Bilateral reaching exercises 2 sets of 10 reps each  -Green Flexbar Exercises for pronation, supination, wrist flexion, wrist extension, ulnar deviation, radial deviation   -Black Digiflex Full  2 sets of 10 reps each 5 sec hold; Red Digiflex Individual Digit 1 set of 10 reps each   -Green  Clip 3-Point Pinch 1 set of 10 reps each bilaterally 5 sec hold [place] sorting exercises   -Re-Assessment of Objective Measures  -Green Flexbar Exercises for pronation, supination, wrist flexion, wrist extension  - Bar 40 lb 2 sets of 10 reps each bilaterally  -60 [place] FMC/In-Hand Manipulation and coordination sorting activity -Re-Assessment of Objective Measures  -Review of HEP and POC   Therapeutic Exercise Min 40 45 38 30   Total Timed Procedures 40 45 38 30   Total Service Procedures 40 45 38 30   Total Time 40 45 38 30       Multiple values from one day are sorted in reverse-chronological order         HEP   -Flexion Digit Tendon  Gliding Exercises  -Thumb ROM Exercises  -Yellow Theraputty Strengthening Exercises  -2-3 lb Weight Eccentric Wrist Strengthening Exercises  -Hammer Pronation/Supination Exercises    Charges     TEx2    Post QuickDASH Outcome Score  Post Score: 25 % (6/16/2025  1:31 PM)    22.73 % improvement    Patient/Family/Caregiver was advised of these findings, precautions, and treatment options and has agreed to actively participate in planning and for this course of care.    Thank you for your referral. If you have any questions, please contact me at Dept: [phone].    Sincerely,  Electronically signed by therapist: Cesar Garcia, OT         Certification From: 6/16/2025  To:9/14/2025

## 2025-06-18 ENCOUNTER — APPOINTMENT (OUTPATIENT)
Dept: OCCUPATIONAL MEDICINE | Facility: HOSPITAL | Age: 50
End: 2025-06-18
Attending: PHYSICAL MEDICINE & REHABILITATION
Payer: COMMERCIAL

## 2025-08-19 DIAGNOSIS — M54.50 LOW BACK PAIN, UNSPECIFIED BACK PAIN LATERALITY, UNSPECIFIED CHRONICITY, UNSPECIFIED WHETHER SCIATICA PRESENT: Primary | ICD-10-CM

## 2025-08-25 ENCOUNTER — OFFICE VISIT (OUTPATIENT)
Dept: ORTHOPEDICS CLINIC | Facility: CLINIC | Age: 50
End: 2025-08-25

## 2025-08-25 VITALS — BODY MASS INDEX: 19.46 KG/M2 | WEIGHT: 114 LBS | HEIGHT: 64 IN

## 2025-08-25 DIAGNOSIS — M48.02 CERVICAL STENOSIS OF SPINE: Primary | ICD-10-CM

## 2025-08-25 PROCEDURE — 99205 OFFICE O/P NEW HI 60 MIN: CPT | Performed by: STUDENT IN AN ORGANIZED HEALTH CARE EDUCATION/TRAINING PROGRAM

## 2025-08-25 PROCEDURE — 3008F BODY MASS INDEX DOCD: CPT | Performed by: STUDENT IN AN ORGANIZED HEALTH CARE EDUCATION/TRAINING PROGRAM

## (undated) NOTE — ED AVS SNAPSHOT
Wm Hahn   MRN: [id number]    Department:  [facility] Emergency Department   Date of Visit:  11/26/2017           Disclosure     Insurance plans vary and the physician(s) referred by the ER may not be covered by your plan.  Please contact your tell this physician (or your personal doctor if your instructions are to return to your personal doctor) about any new or lasting problems. The primary care or specialist physician will see patients referred from the [facility] Emergency Department.  Renee Murphy

## (undated) NOTE — ED AVS SNAPSHOT
Berenice Aviles   MRN: [id number]    Department:  [facility] Emergency Department   Date of Visit:  2/14/2020           Disclosure     Insurance plans vary and the physician(s) referred by the ER may not be covered by your plan.  Please contact your tell this physician (or your personal doctor if your instructions are to return to your personal doctor) about any new or lasting problems. The primary care or specialist physician will see patients referred from the [facility] Emergency Department.  Jin Woo